# Patient Record
Sex: MALE | Race: WHITE | NOT HISPANIC OR LATINO | Employment: FULL TIME | URBAN - METROPOLITAN AREA
[De-identification: names, ages, dates, MRNs, and addresses within clinical notes are randomized per-mention and may not be internally consistent; named-entity substitution may affect disease eponyms.]

---

## 2017-10-03 ENCOUNTER — TRANSFERRED RECORDS (OUTPATIENT)
Dept: HEALTH INFORMATION MANAGEMENT | Facility: CLINIC | Age: 57
End: 2017-10-03

## 2017-10-24 ENCOUNTER — TRANSFERRED RECORDS (OUTPATIENT)
Dept: HEALTH INFORMATION MANAGEMENT | Facility: CLINIC | Age: 57
End: 2017-10-24

## 2017-10-24 LAB — EJECTION FRACTION: 60 %

## 2017-11-14 ENCOUNTER — TRANSFERRED RECORDS (OUTPATIENT)
Dept: HEALTH INFORMATION MANAGEMENT | Facility: CLINIC | Age: 57
End: 2017-11-14

## 2017-12-01 LAB
CREATININE (EXTERNAL): 1.09 MG/DL (ref 0.6–1.5)
GFR ESTIMATED (EXTERNAL): 75 ML/MIN/1.73M2
GFR ESTIMATED (IF AFRICAN AMERICAN) (EXTERNAL): 87 ML/MIN/1.73M2
GLUCOSE (EXTERNAL): 92 MG/DL (ref 65–99)
POTASSIUM (EXTERNAL): 4.1 MMOL/L (ref 3.5–5.2)

## 2017-12-21 ENCOUNTER — TRANSFERRED RECORDS (OUTPATIENT)
Dept: HEALTH INFORMATION MANAGEMENT | Facility: CLINIC | Age: 57
End: 2017-12-21

## 2018-12-19 ENCOUNTER — TRANSFERRED RECORDS (OUTPATIENT)
Dept: HEALTH INFORMATION MANAGEMENT | Facility: CLINIC | Age: 58
End: 2018-12-19

## 2019-12-10 ENCOUNTER — TRANSFERRED RECORDS (OUTPATIENT)
Dept: HEALTH INFORMATION MANAGEMENT | Facility: CLINIC | Age: 59
End: 2019-12-10

## 2019-12-18 ENCOUNTER — TRANSFERRED RECORDS (OUTPATIENT)
Dept: HEALTH INFORMATION MANAGEMENT | Facility: CLINIC | Age: 59
End: 2019-12-18

## 2020-01-19 ENCOUNTER — TRANSFERRED RECORDS (OUTPATIENT)
Dept: HEALTH INFORMATION MANAGEMENT | Facility: CLINIC | Age: 60
End: 2020-01-19

## 2021-01-20 ENCOUNTER — TRANSFERRED RECORDS (OUTPATIENT)
Dept: HEALTH INFORMATION MANAGEMENT | Facility: CLINIC | Age: 61
End: 2021-01-20

## 2021-07-21 ENCOUNTER — TRANSFERRED RECORDS (OUTPATIENT)
Dept: HEALTH INFORMATION MANAGEMENT | Facility: CLINIC | Age: 61
End: 2021-07-21

## 2021-11-18 ENCOUNTER — TRANSFERRED RECORDS (OUTPATIENT)
Dept: HEALTH INFORMATION MANAGEMENT | Facility: CLINIC | Age: 61
End: 2021-11-18

## 2021-12-08 ENCOUNTER — TRANSFERRED RECORDS (OUTPATIENT)
Dept: HEALTH INFORMATION MANAGEMENT | Facility: CLINIC | Age: 61
End: 2021-12-08

## 2022-01-19 ENCOUNTER — TRANSFERRED RECORDS (OUTPATIENT)
Dept: HEALTH INFORMATION MANAGEMENT | Facility: CLINIC | Age: 62
End: 2022-01-19

## 2022-03-03 ENCOUNTER — REFERRAL (OUTPATIENT)
Dept: TRANSPLANT | Facility: CLINIC | Age: 62
End: 2022-03-03

## 2022-03-03 NOTE — TELEPHONE ENCOUNTER
"Donor Intake Start:22Donor Intake Complete:22  Expiration Date:22  Gender:MalePreferred Language:English  Full Name:Jose Eden  Needed:[not answered]  Phone Number:[phone] Phone:  Contact Preference:[not answered]Best Contact Time:9am - 5pm  Emergency Contact:Sharda Quevedo Contact #:[phone]  Relationship to Contact:Contact is my spouse  :[birth year]Age:61  Country:[place]  Address:[address] [place] [address] RdCity:[place]  State:[id number]  Height:6'3\"Weight:242lbs  BMI:30.2  Employment Status:EmployedHas PTO for donation?Yes, using vacation  Occupation:PastorRequires Heavy Lifting?  No  Education Level:Advanced DegreeMarital Status:  Exercise Routine:4-6/WeekHealth Insurance:  No  Blood Type:UnknownEthnicity/Race:White  Donor Type:Standard Voucher Donor  Prefer Remote Donation:[not answered]  Physician:NAGI Flores  Donating for Recipient Transfer  Recipient's Center:[unknown]   Recipient's Name:Alexandro Pat :63  Recipient's Status:Patient on dialysis.How DD knows Recip:Friend  DD communicates w/ Recip:Less Than Once A Month  Indicated possible interest in:  Motivation to donate:  I love and want to help my friend!  Living Donor Pre-Screening  Is In [place]  Yes  Will Accept Blood Transfusions?  Yes  Has been Diagnosed with Kidney Disease?  No  Has had a Heart Attack?  No  Has Diabetes?  No  Has had Cancer?  No  Has had Kidney Stones?  No  Has Used Tobacco  Yes    - Currently uses Tobacco?  No    - Will Stop for Surgery?N/A    - How Many Years:1    - Tobacco use (packs/cans) / Frequency:1 / Monthly  Has HIV?  No  Is Currently Incarcerated?  No  Is Currently Residing in [place]  Yes  History Misc  Has Allergies?  Yes  Allergy  Sulfa medication  Has had Surgeries?  Yes  Surgery When  Rib fixation On 6 Ribs, bicep reattachment, cartilage knee survery 2021, 6 years ago, 39 years ago  Takes " Medication?  Yes  Medication Dose Frequency  Lialda Water pill 250 mg. 25 mg One a day. One a day  Medical History  History of High BP?  Never  Has History Of CABG (bypass surgery)?  No  History of Blood Clots?  Never  History of Coronary Disease?  Never  Has Stents Implanted?  No  Has History of Chest Pain with Exercise?  No  Has History of Chest Pain at Other Times?  No  Results of Climbing 2 Flights of Stairs?No Problem  Has had Stress Test within Last Year?  No  Has had Stroke?  No  Has had Leg Bypass?  No  History of Lung Disease?  Never  History of COPD?  Never  History of TB?  Never    - Is TB Active?[not answered]  History of Pneumonia?  Never  Has Respiratory Issues?  No  Has Gastro Issues?  Yes    - Gastro Issues:Colitis but it has been inactive for 10 years and my last colonoscopy showed no colitis  History of Gallstones?  Never  History of Pancreatitis?  Never  History of Liver Disease?  Never  History of Hepatitis B?  Never    - Is Hep B Active?[not answered]  History of Hepatitis C?  Never  History of Bleeding Problem?  Never  History of UTIs?  No  History of Kidney Damage?  Never  History of Proteinuria?  Never  History of Hematuria?  Never  History of Neuro Disease?  Never  History of Seizure?  Never  History of Lupus?  Never  History of Paralysis?  Never  History of Arthritis?  Never  History of Neuropathy?  Never  History of Depression?  Never  History of Anxiety?  Never  History of Documented Psychiatric Illness?  Never  Has had Transfusions?  No  History of Obesity?  No  History of Fabry's Disease?  No  History of Sickle Cell Disease?  No  History of Sickle Cell Trait?  No  History of Sarcoidosis?  No  History of Auto-Immune Disease  No  Has had Physical Exam?  Yes    - how many years ago:4  Has had PSA Test?  No  Has had Colonoscopy?  Yes    - How Many Years Ago:1  Medical History Comments?[no comments]  Living Donor Family Medical History  Anyone with kidney disease?  No  Anyone with liver  disease?  No  Anyone with heart disease?  No  Anyone with coronary artery disease?  No  Anyone with high blood pressure?  Yes    - which family members:Father, sister  Anyone with blood disorder?  No  Anyone with cancer?  Yes    - which family members:Mother  Anyone with kidney cancer?  No  Anyone with diabetes?  Yes    - which family members:Sister  Is mother alive?  No  Mother's age?87  Mother's cause of death?Pancreatic cancer  Is father alive?  No  Father's age?79  Father's cause of death?Complications of Primary Proggressive Aphasia  How many siblings?5  How many adult children?5  How many children under 18?0  Social History  Has Used Alcohol?  Yes    - currently uses alcohol:  Yes    - how much:2/Weekly  Has Abused Alcohol?  No  Has Used Drugs?  No  Has had legal issues w/ law enforcement?  No  Traveled over 100 miles from home in last year?  Yes    - Traveled Where?In state and out of state  Has had suicidal thoughts or attempts in the last five years?  No

## 2022-03-10 ENCOUNTER — TELEPHONE (OUTPATIENT)
Dept: TRANSPLANT | Facility: CLINIC | Age: 62
End: 2022-03-10

## 2022-03-10 NOTE — TELEPHONE ENCOUNTER
I left a  for Jose.  He was scheduled to complete his initial MARILYN call at 1:30 pm today.    RAMYA Senior, [facility]  Independent Living Donor Advocate  [facility], [facility], [facility]  Pager:  [phone]  Direct:  [phone] Cell Phone  E-Mail:  [email or web address]

## 2022-03-10 NOTE — TELEPHONE ENCOUNTER
Initial Independent Living Donor Advocate contact made with potential donor today.  I introduced myself and my role during the donation process, includin.  MARILYN ROLE   The federal government requires that all licensed transplant centers provide the living donor with an Independent Living Donor Advocate (MARILYN).  I do not meet recipients or attend meetings that discuss their care or decision to transplant them. My role is separate to avoid any conflict of interest.  My role is to ensure:  1) your rights are protected;  2) you get all the information you need from the transplant team to make a fully informed decision whether to donate;   3) that living donation is in your best interest.   4) that you have the right to decide NOT to go forward with living donation at any time during this process.  I am available to you throughout the workup, during surgery phase and follow-up at home.   2. WORKUP & PRIVACY     Your identity and workup are not shared with the recipient at any time.     There is a medical donor workup that consists of testing to determine if you are healthy enough to donate.  Workup tests include many blood draws, urine collection/ (kidney function testing), chest x-ray, EKG, CT scan. As you complete each step then you may move on to the next.  Workup can take as little or as long as you need and you can stop the process at any time.     Transplant is a treatment option, not a cure. A kidney from a living kidney donor can last 12-14 years.  Other treatment options are  donation and two types of dialysis.     This is major surgery and your estimated hospital stay is approximately 1-2 nights.  After surgery, there are driving and lifting restrictions - no driving for two weeks and no lifting over ten pounds for 6 - 8 weeks.  Donors are routinely off from work for 4 - 6 weeks after surgery, and potentially longer if they have a physical job.       If you anticipate lost wages due to donation,  donor wage reimbursement options may be available to you and will be reviewed with you during the evaluation process.      The recipient's insurance covers the medical expenses related to the donor evaluation and surgery.  However, it is important for you to carry your own health insurance to address any medical issues that are found and are NOT related to living donation.  3.  QUESTIONS  Have you received a packet from the transplant department?     Questions?    Have you discussed with anyone your potential decision to donate?   Yes.  Is anyone pressuring or coercing you to donate? No.  Have you discussed any financial arrangements with recipient around donating a kidney? No.  Are you aware that you can confidentially opt out at any time, up to and including day of donation? Yes.  At this time, would you like to proceed with the medical evaluation to see if you can be a kidney donor?  Yes.    If yes, the donor coordinator will be reaching out to you with next steps.     You can reach me or someone else on the MARILYN team by calling [phone] Option 3.    MARILYN NOTES: Jose wants to donate to a friend.  He is scheduled to talk to Dayana on 3/22 at 12:15 pm    Duration of call 20 minutes

## 2022-03-22 ENCOUNTER — TELEPHONE (OUTPATIENT)
Dept: TRANSPLANT | Facility: CLINIC | Age: 62
End: 2022-03-22

## 2022-03-22 NOTE — TELEPHONE ENCOUNTER
Contacted Jose Pimentel to introduce myself and my role, review of medical/surgical/family history and next steps.     Jose Pimentel  is aware He can stop donor evaluation at any time.    Have you ever been positive for COVID 19? Not discussed     Have you received the COVID vaccination? Not discussed If yes, when and what brand? Not discussed     Review risk of COVID-19 to living donors.     Jose Pimentel is a 61 year old male that would like to learn more about being a donor to his friend Alexandro Simpson. Jose is open to paired exchange     Concerns from medical/surgical/family history: yes, hx colitis. Reports colitis has been stable, no flairs, x10 years and last colonoscopy did not show any areas of inflammation. He does take a low dose of Lialda daily. Reports occasional diarrhea that he is able to control with dietary modifications. No hx kidney stones.     Plan established to review about with donor team prior to moving forward. Jose verbalized understanding and comfort with plan.

## 2022-03-23 ENCOUNTER — COMMITTEE REVIEW (OUTPATIENT)
Dept: TRANSPLANT | Facility: CLINIC | Age: 62
End: 2022-03-23

## 2022-03-23 NOTE — COMMITTEE REVIEW
Living Donor Patient Discussion Note Transplant Coordinator: Dayana Ga  Transplant Surgeon:       Committee Review Members:  Immunology Rhys Valencia, PhD   Nephrology Baltazar Mercedes MD, Julio Nolan MD, Ferny Barton MD   Nutrition Jolie Harris, ADRIA   Pharmacist Jeffery Pereyra, [facility]    - Clinical Rose Swain, [facility], Denise Lomeli, [facility], Jessica Dominguez   Transplant Nissa Mary Thompson, RN, Valentin Cardenas, RACIEL, Trinidad Amezquita, N, Vanessa Mahoney, RACIEL, Dayana Ga, RN, Candice Neal, RN   Transplant Surgery Daryl Denton MD       Additional Discussion Notes and Findings:     Medical hx colitis reviewed with team. Team recommended obtaining recent GI notes, colonscopy and lab work to review. Typically colitis would exclude a person from moving forward as a donor but since it has been stable for 10 years, could review records for possible consideration.     Jose updated of above. He will have records faxed for review, fax number provided.

## 2022-03-29 ENCOUNTER — TRANSFERRED RECORDS (OUTPATIENT)
Dept: HEALTH INFORMATION MANAGEMENT | Facility: CLINIC | Age: 62
End: 2022-03-29

## 2022-03-30 ENCOUNTER — TELEPHONE (OUTPATIENT)
Dept: TRANSPLANT | Facility: CLINIC | Age: 62
End: 2022-03-30

## 2022-03-30 ENCOUNTER — COMMITTEE REVIEW (OUTPATIENT)
Dept: TRANSPLANT | Facility: CLINIC | Age: 62
End: 2022-03-30
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
## 2022-03-30 NOTE — COMMITTEE REVIEW
Living Donor Patient Discussion Note Transplant Coordinator: Dayana Ga  Transplant Surgeon:       Committee Review Members:  Immunology Rhys Valencia, PhD, Meenakshi Ricci   Nephrology Baltazar Mercedes MD, Julio Nolan MD, Regis Ramirez MD, Ferny Barton MD   Nutrition Jolie Harris, RD   Pharmacist Jeffery Pereyra, [facility], Suzette Villa, [facility]    - Clinical Rose Swain, [facility]   Transplant Nissa Mary Thompson, RACIEL, Olga Ferris, NP, Valentin Cardenas, RACIEL, Trinidad Amezquita, LPN, Dayana Ga, RN, Candice Neal, RN   Transplant Surgery Tracee Gavin MD, MD       Additional Discussion Notes and Findings:     Records from last GI visit 11/18/21 at  [facility] and last coloscopy from 12/8/2021 reviewed. History of Crohns noted.     Team recommended litholink with phase 1 testing, otherwise okay to bring for evaluation.

## 2022-03-31 ENCOUNTER — DOCUMENTATION ONLY (OUTPATIENT)
Dept: TRANSPLANT | Facility: CLINIC | Age: 62
End: 2022-03-31

## 2022-03-31 DIAGNOSIS — Z00.5 TRANSPLANT DONOR EVALUATION: Primary | ICD-10-CM

## 2022-03-31 NOTE — PROGRESS NOTES
Sent Jose Phase 1 orders as well as [employer] info/instructions and billing letters.Prep orders given for both the Phase 1's and Litholinks. Is to contact us when he has turned in [employer] instructions.

## 2022-04-12 ENCOUNTER — DOCUMENTATION ONLY (OUTPATIENT)
Dept: TRANSPLANT | Facility: CLINIC | Age: 62
End: 2022-04-12

## 2022-04-12 ENCOUNTER — TELEPHONE (OUTPATIENT)
Dept: TRANSPLANT | Facility: CLINIC | Age: 62
End: 2022-04-12

## 2022-04-12 NOTE — PROGRESS NOTES
"Received vitals from [facility] in [place], [place] phone # [phone]:BP's 146/82,124/70,126/70,122/70 Ht=6' 3\"-238#'s BMI=29.8.  "

## 2022-04-12 NOTE — TELEPHONE ENCOUNTER
LM asking Jose if he's done the Phase 1's yet and if he has an idea when he's going to do the Litholinks test.To call me back and let me know.

## 2022-04-13 ENCOUNTER — TELEPHONE (OUTPATIENT)
Dept: TRANSPLANT | Facility: CLINIC | Age: 62
End: 2022-04-13

## 2022-04-13 NOTE — TELEPHONE ENCOUNTER
"Received this email from Jose re:Phase 1 labs:  \"I am getting my lab work done today.  I plan on doing my urine test on Friday.\"     "

## 2022-04-16 ENCOUNTER — TRANSFERRED RECORDS (OUTPATIENT)
Dept: HEALTH INFORMATION MANAGEMENT | Facility: CLINIC | Age: 62
End: 2022-04-16

## 2022-04-27 ENCOUNTER — COMMITTEE REVIEW (OUTPATIENT)
Dept: TRANSPLANT | Facility: CLINIC | Age: 62
End: 2022-04-27

## 2022-04-27 NOTE — COMMITTEE REVIEW
Living Donor Patient Discussion Note Transplant Coordinator: Dayana Ga  Transplant Surgeon:       Committee Review Members:  Immunology Rhys Valencia, PhD   Nephrology Baltazar Mercedes MD, Titus Chowdary MD, Regis Ramirez MD, Ferny Barton MD   Nutrition Jolie Harris, ADRIA   Pharmacist Jeffery Pereyra, [facility]    - Clinical Rose Swain, [facility], Jessica Dominguez   Transplant Nissa Mary Thompson, RN, Britta Mack, RN, Valentin Cardenas, RACIEL, Trinidad Amezquita, GURPREET, Vanessa Mahoney, RACIEL, Dayana Ga, RN, Candice Neal, RN   Transplant Surgery Lei Hanson MD       Additional Discussion Notes and Findings:     Litholink and phase 1 testing reviewed.     Plan for nephrology to review further and advise if evaluation appropriate.     Records sent to Dr. Barton for review.     Jose updated of above.

## 2022-05-04 ENCOUNTER — COMMITTEE REVIEW (OUTPATIENT)
Dept: TRANSPLANT | Facility: CLINIC | Age: 62
End: 2022-05-04

## 2022-05-04 ENCOUNTER — TELEPHONE (OUTPATIENT)
Dept: TRANSPLANT | Facility: CLINIC | Age: 62
End: 2022-05-04

## 2022-05-04 NOTE — COMMITTEE REVIEW
Living Donor Patient Discussion Note Transplant Coordinator: Dayana Ga  Transplant Surgeon:       Committee Review Members:  Immunology Rhys Valencia, PhD, Meenakshi Ricci   Nephrology Baltazar Mercedes MD, Regis Ramirez MD, Ferny Barton MD   Nutrition Jolie Harris, ADRIA   Pharmacist Jeffery Pereyra, [facility]    - Clinical Rose Swain, [facility]   Transplant Nissa Mary Thompson, RN, Valentin Cardenas, RN, Dayana Ga, RACIEL, Candice Neal RN   Transplant Surgery Omid Funk MD       Additional Discussion Notes and Findings:     Case reviewed by Dr. Barton. Team supported Jose moving forward with evaluation. Could do creatinine clearence prior to coming if he wants.     Above reviewed with Jose who confirmed his desire to move forward with scheduling evaluation. He would like to come on June 2nd. He will review consent and [facility] prior to evaluation. He knows he will need a COVID PCR and to arrive fasting and hydrated. Message sent for scheduling and also to JHON.

## 2022-05-04 NOTE — TELEPHONE ENCOUNTER
SW called patient and left message for potential living donor to discuss financial assistance options for upcoming travel to [facility].     SW received call back and reviewed NKR, NLDAC, and other financial resources available. Jose would like to inquire if he is eligible for NLDAC assistance. SW to send paperwork via email if eligible.     Jessica Dominguez [facility] Center[facility], CCTSW   Living Donor   [facility], [facility], [facility]  Direct: [phone]  E-Mail: [email or web address]

## 2022-05-06 DIAGNOSIS — Z00.5 TRANSPLANT DONOR EVALUATION: Primary | ICD-10-CM

## 2022-05-06 RX ORDER — METHYLPREDNISOLONE SODIUM SUCCINATE 125 MG/2ML
125 INJECTION, POWDER, LYOPHILIZED, FOR SOLUTION INTRAMUSCULAR; INTRAVENOUS
Status: CANCELLED
Start: 2022-06-02

## 2022-05-06 RX ORDER — ALBUTEROL SULFATE 90 UG/1
1-2 AEROSOL, METERED RESPIRATORY (INHALATION)
Status: CANCELLED
Start: 2022-06-02

## 2022-05-06 RX ORDER — DIPHENHYDRAMINE HYDROCHLORIDE 50 MG/ML
50 INJECTION INTRAMUSCULAR; INTRAVENOUS
Status: CANCELLED
Start: 2022-06-02

## 2022-05-06 RX ORDER — EPINEPHRINE 1 MG/ML
0.3 INJECTION, SOLUTION, CONCENTRATE INTRAVENOUS EVERY 5 MIN PRN
Status: CANCELLED | OUTPATIENT
Start: 2022-06-02

## 2022-05-06 RX ORDER — ALBUTEROL SULFATE 0.83 MG/ML
2.5 SOLUTION RESPIRATORY (INHALATION)
Status: CANCELLED | OUTPATIENT
Start: 2022-06-02

## 2022-05-06 RX ORDER — MEPERIDINE HYDROCHLORIDE 25 MG/ML
25 INJECTION INTRAMUSCULAR; INTRAVENOUS; SUBCUTANEOUS EVERY 30 MIN PRN
Status: CANCELLED | OUTPATIENT
Start: 2022-06-02

## 2022-05-06 RX ORDER — NALOXONE HYDROCHLORIDE 0.4 MG/ML
0.2 INJECTION, SOLUTION INTRAMUSCULAR; INTRAVENOUS; SUBCUTANEOUS
Status: CANCELLED | OUTPATIENT
Start: 2022-06-02

## 2022-05-19 ENCOUNTER — DOCUMENTATION ONLY (OUTPATIENT)
Dept: TRANSPLANT | Facility: CLINIC | Age: 62
End: 2022-05-19

## 2022-05-19 NOTE — PROGRESS NOTES
[facility] informed Jose and JHON that he was approved for assistance with travel expenses through NLDAC program.     Jessica Dominguez, [facility]ON, CCTSW   Living Donor   [facility] [place], [facility], [facility]  Direct: [phone]  E-Mail: [email or web address]

## 2022-05-29 ENCOUNTER — HEALTH MAINTENANCE LETTER (OUTPATIENT)
Age: 62
End: 2022-05-29

## 2022-06-01 NOTE — PROGRESS NOTES
[facility] Solid Organ Transplant  Outpatient MNT: Kidney Donor Evaluation    Current BMI: 29.79 (HT 75 in, .4 lbs/108.1 kg)  BMI is within recommendation of <30 for kidney donation  -Discussed that pt should not gain additional weight d/t being borderline BMI > 30. Also should be noted pt with muscular build and history of weight lifting.     8 Year Estimated Risk of T2DM  4%     Time Spent: 15 minutes  Visit Type: Initial  Referring Physician: Titus Chowdary MD  Pt accompanied by: Self    Nutrition Assessment     Vitamins, Supplements, Pertinent Meds: None.   Herbal Medicines/Supplements: Protein supplements (discussed pt checking this for herbals / extracts when able).     Weight hx: Pt states historically steady weight.     Food Security: any concerns about having enough money to buy food or access to grocery stores? None.     Diet Recall  Breakfast Often skips breakfast, if he does have breakfast he will have Cheerios and raisins.    Lunch PBJ sandwiches, soup.    Dinner Steak, perogis, onion rings    Snacks Granola bar, pretzels,    Beverages Water, occasional diet Coke, some sparkling polanco. Some coffee too, no sugar / cream.    Alcohol 1-2 times a week, glass or two of wine.    Dining out ~2 times a month, breakfast places, fish / mexican restaurant.      Physical Activity  Home workout 6 days a week (bike or rowing), calisthenics, and has some free weights.     Labs  No results for input(s): CHOL, HDL, LDL, TRIG, CHOLHDLRATIO in the last 78777 hours.    FBG = 114  A1c = 5.6%   BP = 126/77  Sister has T2DM.   Prediction of Incident Diabetes Mellitus in Middle-aged Adults: The Erwin Offspring Study  Wisam Nino MD; James B. Meigs, MD, MPH; Delmis Montenegro, PhD; Jessica Mack MD, MPH; Rhys Yepez MD; Bill Guo Sr,   PhD  Pt's estimated risk for T2DM (per Table 6 above)  Pt received points for the following criteria: FBG of 114 mg/dL, and BMI 25.0-29.9.  Total  points: 12  8-Year estimated risk of T2DM: 4%    Nutrition Diagnosis  No nutrition diagnosis identified at this time.    Nutrition Intervention  Nutrition education provided:  Reviewed overall healthy diet guidelines for pre and post kidney donation. Discussed maintenance of a healthy weight and Na+ intake <3000 mg/day (<2000 mg/day if HTN).    Avoid the following post op d/t unknown effects on the organs:  - Herbal, Chinese, holistic, chiropractic, natural, alternative medicines and supplements  - Detoxes and cleanses  - Weight loss pills  - Protein powders or other products with extracts or herbs (ie green tea extract)    Patient Understanding: Pt verbalized understanding of education provided.  Expected Engagement: Good  Follow-Up Plans: PRN     Nutrition Goals  No nutrition goals identified at this time     Uday Matta RD, LD

## 2022-06-02 ENCOUNTER — OFFICE VISIT (OUTPATIENT)
Dept: INFUSION THERAPY | Facility: CLINIC | Age: 62
End: 2022-06-02
Attending: INTERNAL MEDICINE

## 2022-06-02 ENCOUNTER — OFFICE VISIT (OUTPATIENT)
Dept: NEPHROLOGY | Facility: CLINIC | Age: 62
End: 2022-06-02
Attending: TRANSPLANT SURGERY

## 2022-06-02 ENCOUNTER — LAB (OUTPATIENT)
Dept: LAB | Facility: CLINIC | Age: 62
End: 2022-06-02
Attending: INTERNAL MEDICINE

## 2022-06-02 ENCOUNTER — OFFICE VISIT (OUTPATIENT)
Dept: TRANSPLANT | Facility: CLINIC | Age: 62
End: 2022-06-02

## 2022-06-02 ENCOUNTER — APPOINTMENT (OUTPATIENT)
Dept: TRANSPLANT | Facility: CLINIC | Age: 62
End: 2022-06-02
Attending: TRANSPLANT SURGERY

## 2022-06-02 ENCOUNTER — ALLIED HEALTH/NURSE VISIT (OUTPATIENT)
Dept: TRANSPLANT | Facility: CLINIC | Age: 62
End: 2022-06-02
Attending: TRANSPLANT SURGERY

## 2022-06-02 ENCOUNTER — OFFICE VISIT (OUTPATIENT)
Dept: TRANSPLANT | Facility: CLINIC | Age: 62
End: 2022-06-02
Attending: TRANSPLANT SURGERY

## 2022-06-02 ENCOUNTER — ANCILLARY PROCEDURE (OUTPATIENT)
Dept: GENERAL RADIOLOGY | Facility: CLINIC | Age: 62
End: 2022-06-02
Attending: INTERNAL MEDICINE

## 2022-06-02 ENCOUNTER — ANCILLARY PROCEDURE (OUTPATIENT)
Dept: CT IMAGING | Facility: CLINIC | Age: 62
End: 2022-06-02
Attending: INTERNAL MEDICINE

## 2022-06-02 VITALS
WEIGHT: 238.32 LBS | BODY MASS INDEX: 29.63 KG/M2 | OXYGEN SATURATION: 99 % | DIASTOLIC BLOOD PRESSURE: 77 MMHG | HEART RATE: 82 BPM | SYSTOLIC BLOOD PRESSURE: 126 MMHG | HEIGHT: 75 IN

## 2022-06-02 VITALS
WEIGHT: 238.3 LBS | HEART RATE: 82 BPM | DIASTOLIC BLOOD PRESSURE: 77 MMHG | SYSTOLIC BLOOD PRESSURE: 126 MMHG | OXYGEN SATURATION: 99 % | HEIGHT: 75 IN | BODY MASS INDEX: 29.63 KG/M2

## 2022-06-02 VITALS
DIASTOLIC BLOOD PRESSURE: 83 MMHG | SYSTOLIC BLOOD PRESSURE: 131 MMHG | HEART RATE: 54 BPM | WEIGHT: 238.7 LBS | BODY MASS INDEX: 29.68 KG/M2 | RESPIRATION RATE: 16 BRPM | OXYGEN SATURATION: 100 % | HEIGHT: 75 IN | TEMPERATURE: 97.4 F

## 2022-06-02 DIAGNOSIS — E78.2 MIXED HYPERLIPIDEMIA: ICD-10-CM

## 2022-06-02 DIAGNOSIS — N28.1 RENAL CYST, RIGHT: ICD-10-CM

## 2022-06-02 DIAGNOSIS — Z00.5 TRANSPLANT DONOR EVALUATION: ICD-10-CM

## 2022-06-02 DIAGNOSIS — R73.9 HYPERGLYCEMIA: ICD-10-CM

## 2022-06-02 DIAGNOSIS — K50.918 CROHN'S DISEASE WITH OTHER COMPLICATION, UNSPECIFIED GASTROINTESTINAL TRACT LOCATION (H): ICD-10-CM

## 2022-06-02 DIAGNOSIS — R82.994 HYPERCALCIURIA: ICD-10-CM

## 2022-06-02 DIAGNOSIS — Z00.5 TRANSPLANT DONOR EVALUATION: Primary | ICD-10-CM

## 2022-06-02 DIAGNOSIS — R94.31 T WAVE INVERSION IN EKG: ICD-10-CM

## 2022-06-02 DIAGNOSIS — R76.8 POSITIVE HEPATITIS C ANTIBODY TEST: ICD-10-CM

## 2022-06-02 DIAGNOSIS — B18.2 CHRONIC HEPATITIS C WITHOUT HEPATIC COMA (H): ICD-10-CM

## 2022-06-02 LAB
ABO/RH(D): NORMAL
ABO/RH(D): NORMAL
ALBUMIN SERPL-MCNC: 3.9 G/DL (ref 3.4–5)
ALBUMIN UR-MCNC: NEGATIVE MG/DL
ALP SERPL-CCNC: 67 U/L (ref 40–150)
ALT SERPL W P-5'-P-CCNC: 43 U/L (ref 0–70)
ANION GAP SERPL CALCULATED.3IONS-SCNC: 8 MMOL/L (ref 3–14)
ANTIBODY SCREEN: NORMAL
APPEARANCE UR: CLEAR
AST SERPL W P-5'-P-CCNC: 29 U/L (ref 0–45)
BILIRUB SERPL-MCNC: 0.8 MG/DL (ref 0.2–1.3)
BILIRUB UR QL STRIP: NEGATIVE
BUN SERPL-MCNC: 15 MG/DL (ref 7–30)
CALCIUM SERPL-MCNC: 9 MG/DL (ref 8.5–10.1)
CHLORIDE BLD-SCNC: 104 MMOL/L (ref 94–109)
CHOLEST SERPL-MCNC: 202 MG/DL
CO2 SERPL-SCNC: 27 MMOL/L (ref 20–32)
COLOR UR AUTO: YELLOW
CREAT SERPL-MCNC: 0.94 MG/DL (ref 0.66–1.25)
CREAT UR-MCNC: 85 MG/DL
CREAT UR-MCNC: 85 MG/DL
ERYTHROCYTE [DISTWIDTH] IN BLOOD BY AUTOMATED COUNT: 12.5 % (ref 10–15)
FASTING STATUS PATIENT QL REPORTED: YES
GFR SERPL CREATININE-BSD FRML MDRD: >90 ML/MIN/1.73M2
GLUCOSE BLD-MCNC: 114 MG/DL (ref 70–99)
GLUCOSE UR STRIP-MCNC: NEGATIVE MG/DL
HBA1C MFR BLD: 5.6 % (ref 0–5.6)
HBV CORE AB SERPL QL IA: NONREACTIVE
HBV SURFACE AB SERPL IA-ACNC: 0 M[IU]/ML
HBV SURFACE AG SERPL QL IA: NONREACTIVE
HCT VFR BLD AUTO: 47.1 % (ref 40–53)
HCV AB SERPL QL IA: REACTIVE
HDLC SERPL-MCNC: 68 MG/DL
HGB BLD-MCNC: 16.3 G/DL (ref 13.3–17.7)
HGB UR QL STRIP: NEGATIVE
HIV 1+2 AB+HIV1 P24 AG SERPL QL IA: NONREACTIVE
INR PPP: 0.9 (ref 0.85–1.15)
KETONES UR STRIP-MCNC: NEGATIVE MG/DL
LDLC SERPL CALC-MCNC: 117 MG/DL
LEUKOCYTE ESTERASE UR QL STRIP: NEGATIVE
MCH RBC QN AUTO: 30.8 PG (ref 26.5–33)
MCHC RBC AUTO-ENTMCNC: 34.6 G/DL (ref 31.5–36.5)
MCV RBC AUTO: 89 FL (ref 78–100)
MICROALBUMIN UR-MCNC: 5 MG/L
MICROALBUMIN/CREAT UR: 5.88 MG/G CR (ref 0–17)
NITRATE UR QL: NEGATIVE
NONHDLC SERPL-MCNC: 134 MG/DL
PH UR STRIP: 6 [PH] (ref 5–7)
PHOSPHATE SERPL-MCNC: 2.7 MG/DL (ref 2.5–4.5)
PLATELET # BLD AUTO: 186 10E3/UL (ref 150–450)
POTASSIUM BLD-SCNC: 3.5 MMOL/L (ref 3.4–5.3)
PROT SERPL-MCNC: 7.2 G/DL (ref 6.8–8.8)
PROT UR-MCNC: 0.07 G/L
PROT/CREAT 24H UR: 0.08 G/G CR (ref 0–0.2)
PSA SERPL-MCNC: 0.35 UG/L (ref 0–4)
RBC # BLD AUTO: 5.3 10E6/UL (ref 4.4–5.9)
RBC URINE: 1 /HPF
SODIUM SERPL-SCNC: 139 MMOL/L (ref 133–144)
SP GR UR STRIP: 1.01 (ref 1–1.03)
SPECIMEN EXPIRATION DATE: NORMAL
SPECIMEN EXPIRATION DATE: NORMAL
T PALLIDUM AB SER QL: NONREACTIVE
TRIGL SERPL-MCNC: 85 MG/DL
URATE SERPL-MCNC: 4.7 MG/DL (ref 3.5–7.2)
UROBILINOGEN UR STRIP-MCNC: NORMAL MG/DL
WBC # BLD AUTO: 5.8 10E3/UL (ref 4–11)
WBC URINE: <1 /HPF

## 2022-06-02 PROCEDURE — 84550 ASSAY OF BLOOD/URIC ACID: CPT

## 2022-06-02 PROCEDURE — 84100 ASSAY OF PHOSPHORUS: CPT

## 2022-06-02 PROCEDURE — 86789 WEST NILE VIRUS ANTIBODY: CPT

## 2022-06-02 PROCEDURE — 85027 COMPLETE CBC AUTOMATED: CPT

## 2022-06-02 PROCEDURE — 99215 OFFICE O/P EST HI 40 MIN: CPT | Performed by: TRANSPLANT SURGERY

## 2022-06-02 PROCEDURE — 71046 X-RAY EXAM CHEST 2 VIEWS: CPT | Mod: GC | Performed by: RADIOLOGY

## 2022-06-02 PROCEDURE — 74175 CTA ABDOMEN W/CONTRAST: CPT | Performed by: RADIOLOGY

## 2022-06-02 PROCEDURE — 36415 COLL VENOUS BLD VENIPUNCTURE: CPT

## 2022-06-02 PROCEDURE — 81378 HLA I & II TYPING HR: CPT

## 2022-06-02 PROCEDURE — 86803 HEPATITIS C AB TEST: CPT

## 2022-06-02 PROCEDURE — 36415 COLL VENOUS BLD VENIPUNCTURE: CPT | Performed by: INTERNAL MEDICINE

## 2022-06-02 PROCEDURE — 84156 ASSAY OF PROTEIN URINE: CPT

## 2022-06-02 PROCEDURE — G0103 PSA SCREENING: HCPCS

## 2022-06-02 PROCEDURE — 82043 UR ALBUMIN QUANTITATIVE: CPT

## 2022-06-02 PROCEDURE — 83036 HEMOGLOBIN GLYCOSYLATED A1C: CPT

## 2022-06-02 PROCEDURE — 80061 LIPID PANEL: CPT

## 2022-06-02 PROCEDURE — 86665 EPSTEIN-BARR CAPSID VCA: CPT

## 2022-06-02 PROCEDURE — 255N000002 HC RX 255 OP 636: Performed by: INTERNAL MEDICINE

## 2022-06-02 PROCEDURE — 99204 OFFICE O/P NEW MOD 45 MIN: CPT | Performed by: TRANSPLANT SURGERY

## 2022-06-02 PROCEDURE — 80053 COMPREHEN METABOLIC PANEL: CPT

## 2022-06-02 PROCEDURE — 86481 TB AG RESPONSE T-CELL SUSP: CPT

## 2022-06-02 PROCEDURE — 82542 COL CHROMOTOGRAPHY QUAL/QUAN: CPT | Performed by: INTERNAL MEDICINE

## 2022-06-02 PROCEDURE — 81001 URINALYSIS AUTO W/SCOPE: CPT

## 2022-06-02 PROCEDURE — 86901 BLOOD TYPING SEROLOGIC RH(D): CPT

## 2022-06-02 PROCEDURE — 86644 CMV ANTIBODY: CPT

## 2022-06-02 PROCEDURE — 86704 HEP B CORE ANTIBODY TOTAL: CPT

## 2022-06-02 PROCEDURE — 86706 HEP B SURFACE ANTIBODY: CPT

## 2022-06-02 PROCEDURE — 87340 HEPATITIS B SURFACE AG IA: CPT

## 2022-06-02 PROCEDURE — 99207 PR NO CHARGE COORDINATED CARE PS: CPT

## 2022-06-02 PROCEDURE — 85610 PROTHROMBIN TIME: CPT | Performed by: INTERNAL MEDICINE

## 2022-06-02 PROCEDURE — 86850 RBC ANTIBODY SCREEN: CPT

## 2022-06-02 PROCEDURE — 99205 OFFICE O/P NEW HI 60 MIN: CPT | Performed by: INTERNAL MEDICINE

## 2022-06-02 PROCEDURE — 86780 TREPONEMA PALLIDUM: CPT

## 2022-06-02 RX ORDER — METHYLPREDNISOLONE SODIUM SUCCINATE 125 MG/2ML
125 INJECTION, POWDER, LYOPHILIZED, FOR SOLUTION INTRAMUSCULAR; INTRAVENOUS
Status: CANCELLED
Start: 2022-06-02

## 2022-06-02 RX ORDER — HYDROCHLOROTHIAZIDE 25 MG/1
25 TABLET ORAL DAILY
Status: ON HOLD | COMMUNITY
Start: 2021-10-04 | End: 2022-10-21

## 2022-06-02 RX ORDER — ALBUTEROL SULFATE 90 UG/1
1-2 AEROSOL, METERED RESPIRATORY (INHALATION)
Status: CANCELLED
Start: 2022-06-02

## 2022-06-02 RX ORDER — DIPHENOXYLATE HYDROCHLORIDE AND ATROPINE SULFATE 2.5; .025 MG/1; MG/1
1 TABLET ORAL DAILY
COMMUNITY

## 2022-06-02 RX ORDER — IBUPROFEN 200 MG
1 CAPSULE ORAL DAILY
COMMUNITY

## 2022-06-02 RX ORDER — NALOXONE HYDROCHLORIDE 0.4 MG/ML
0.2 INJECTION, SOLUTION INTRAMUSCULAR; INTRAVENOUS; SUBCUTANEOUS
Status: CANCELLED | OUTPATIENT
Start: 2022-06-02

## 2022-06-02 RX ORDER — IOPAMIDOL 755 MG/ML
100 INJECTION, SOLUTION INTRAVASCULAR ONCE
Status: COMPLETED | OUTPATIENT
Start: 2022-06-02 | End: 2022-06-02

## 2022-06-02 RX ORDER — MESALAMINE 1.2 G/1
300-600 TABLET, DELAYED RELEASE ORAL DAILY
COMMUNITY
Start: 2012-07-20

## 2022-06-02 RX ORDER — DIPHENHYDRAMINE HYDROCHLORIDE 50 MG/ML
50 INJECTION INTRAMUSCULAR; INTRAVENOUS
Status: CANCELLED
Start: 2022-06-02

## 2022-06-02 RX ORDER — EPINEPHRINE 1 MG/ML
0.3 INJECTION, SOLUTION INTRAMUSCULAR; SUBCUTANEOUS EVERY 5 MIN PRN
Status: CANCELLED | OUTPATIENT
Start: 2022-06-02

## 2022-06-02 RX ORDER — ALBUTEROL SULFATE 0.83 MG/ML
2.5 SOLUTION RESPIRATORY (INHALATION)
Status: CANCELLED | OUTPATIENT
Start: 2022-06-02

## 2022-06-02 RX ORDER — CALCIUM CARBONATE/VITAMIN D3 600 MG-10
1 TABLET ORAL DAILY
COMMUNITY

## 2022-06-02 RX ORDER — MEPERIDINE HYDROCHLORIDE 25 MG/ML
25 INJECTION INTRAMUSCULAR; INTRAVENOUS; SUBCUTANEOUS EVERY 30 MIN PRN
Status: CANCELLED | OUTPATIENT
Start: 2022-06-02

## 2022-06-02 RX ORDER — FLAXSEED OIL
1 OIL (ML) MISCELLANEOUS DAILY
COMMUNITY

## 2022-06-02 RX ADMIN — IOHEXOL 10 ML: 140 INJECTION INTRAVENOUS at 07:58

## 2022-06-02 RX ADMIN — IOPAMIDOL 100 ML: 755 INJECTION, SOLUTION INTRAVASCULAR at 12:49

## 2022-06-02 ASSESSMENT — PAIN SCALES - GENERAL: PAINLEVEL: NO PAIN (0)

## 2022-06-02 NOTE — LETTER
Date:Nolvia 3, 2022      Provider requested that no letter be sent. Do not send.       [facility]

## 2022-06-02 NOTE — PROGRESS NOTES
TRANSPLANT NEPHROLOGY DONOR EVALUATION    Assessment and Plan:    # Prospective Kidney Transplant Donor: Patient with several issues that need to be addressed prior to donation. Patient's blood pressure is acceptable at this visit, kidney function appears to be acceptable with Iohexol performed, and urinalysis is bland.    Despite having Crohn's disease, his disease appears quiescent/well managed on mesalamine in the context of excellent kidney function, particularly when looking at raw function as he is a large man (6'3, 238 lbs with kidneys both over 220cc). He has never had kidney stones, has no stones on imaging.     While he does have mild hypercalciuria, his linkolink is otherwise favorable. This was on hydrochlorothiazide. He could benefit from reducing sodium in his diet as well both for stone and blood pressure risk. Would check PTH, vitamin D, calcitriol. May have incomplete/partial RTA, idiopathic hypercalciuria leading to this. Oxalate within normal limits.     Has EKG abnormalities. Fairly active without known ischemia, dysrhythmia. Would benefit from Cardiology opinion     He has a positive Hepatitis C antibody test. Needs HCV PCR.     Though hyperglycemic, not overt diabetes, age, relatively low risk for progression.       #Ileocolonic Crohn's Disease, Strong family history of colonic neoplasia   -Crohn's quiescent with 4 colonoscopies since 2011 most recent 12/8/2021 without signs of active colitis, or evidence of dysplasia.   -Plan to repeat colonoscopy every 3-5 years     #EKG abnormalities with hypertension on medications, abnormal rhythm.   -Stress Echocardiogram   -Obtain prior cardiology notes with reported dysrhythmia history   -If unrevealing, cardiology consult.     #Hepatitis C antibody reactivity   -HCV PCR     #Hypercalciuria   -He is normocalcemic; would check PTH, calcitriol level     -He has had hypokalemia, variable bicarbonate levels (though usually high) and urine pH >5.3. He could  have an incomplete/partial RTA, idiopathic hypercalciuria as well.     -Reducing sodium intake will help with hypercalciuria     -May benefit from reducing animal protein in diet as well as he has hyperuricosuria.    #Hyperglycemia on fasting blood glucose and hemoglobin A1c 5.6  -Not overtly diabetic   -8 Year Estimated Risk of T2DM = 4%  -Could be in part related to thiazide, though effect mitigated by potassium repletion.     Kristian T, Dilshad LJ, Jimmy ER 3rd, Td MJ, Juan J RS. Changes in serum potassium mediate thiazide-induced diabetes. Hypertension. 2008 Dec;52(6):1022-9. doi: [id number]. Epub 2008 Nov 3. Erratum in: Hypertension. 2009 Feb;53(2):e19. PMID: [id number]; PMCID: [id number].     #Mixed hyperlipidemia   -May benefit from statin, particularly if a donor candidate     #Surgical/Anatomical   -247/476 = 51.9%; renal cysts noted  -229/476 = 48.1%  Left kidney, appreciate surgery CT review   Also with left inguinal hernia    #Psychosocial   -No concerns for living donation     Discussed the risks of donating a kidney, including the surgical risk and the possible risks of living with one kidney.    Education about expected post-donation kidney function and how chronic kidney disease (CKD) and end stage kidney disease (ESKD) might potentially impact the donor in the future, include, but not limited to:       - On average, donors will have 25-35% permanent loss of kidney function at donation.       - Baseline risk of ESKD may slightly exceed that of members of the general population with the same demographic profile.       - Donor risks must be interpreted in light of known epidemiology of both CKD or ESKD, such as that CKD generally develops in midlife (40-50 years old) and ESKD generally develops after age 60.       - Medical evaluation of young potential donors cannot predict lifetime risk of CKD or ESKD.       - Donors may be at higher risk for CKD if they sustain damage to the  remaining kidney.       - Development of CKD and progression of ESKD may be more rapid with only 1 kidney.       - Some type of kidney replacement therapy, either kidney transplant or dialysis, is required when reaching ESKD.       - I described to him absolute and relative risk of ESKD based on the work of Agens et al. I noted that this is an extrapolation from 15 years to lifetime risk. I also described safety net and how his donation would lead to accrual of wait time were he to need a kidney transplant.       Potential medical or surgical risks include, but not limited to:       - Death.       - Scars, pain, fatigue, and other consequences typical of any surgical procedure.       - Decreased kidney function.       - Abdominal or bowel symptoms, such as bloating and nausea, and developing bowel obstruction.       - Kidney failure (ESKD) and the need for a kidney transplant or dialysis for the donor.       - Impact of obesity, hypertension, or other donor-specific medical conditions on morbidity and mortality of the potential donor.    Patients overall evaluation will be discussed with the transplant team and a final recommendation on the patients' suitability to be a kidney transplant donor will be made at that time.    Consult:  Jose Pimentel was seen in consultation at the request of Dr. Lei Hanson for evaluation as a potential kidney transplant donor.    Reason for Visit:  Jose Pimentel is a 61 year old male who presents for a kidney donor evaluation.  Patient would like to donate to his friend.  He would be interested in paired exchange if needed       Present Condition and Donor-Related Medical History:         Kidney Disease Hx:       h/o Kidney Problems: No  Family h/o Genetic Kidney Disease: No       h/o Hypertension: Yes; been on hydrochlorothiazide for 4 years. Only taking 12.5mg for awhile. Wasn't able to work out and put him back on 25mg daily. He has been working out. Haven't measured Bps in  "awhile.       Usual Blood Pressure: Doesn't know       h/o Protein in Urine: No  h/o Blood in Urine: No       h/o Kidney Stones: No  h/o Kidney Injury: No       h/o Recurrent UTI: No  h/o Genitourinary Problems: No       h/o Chronic NSAID Use: No         Other Medical Hx:       h/o Diabetes: No             h/o Gastrointestinal, Pancreas or Liver Problems: Yes: been on mesalamine for 14 years; Crohn's disease, last flare \"years\"/at least 12 years        h/o Lung or Heart Problems: No; he has arrhythmia. Went to a cardiologist. Not atrial fibrillation.        h/o Hematologic Problems: No  h/o Bleeding or Clotting Problems: No       h/o Cancer: No       h/o Infection Problems: No               Skin Cancer Risk:       Sees Dermatologist; has had borderline. Monitor every 6 months        h/o more than 50 moles: No       h/o extensive sun exposure: Yes        h/o melanoma: No       Family h/o melanoma: No         Mental Health Assessment:       h/o Depression: No       h/o Psychiatric Illness: No       h/o Suicidal Attempt(s): No    COVID Status:  Vaccination Up To Date: Yes  H/o COVID Infection: Yes     Review Of Systems:   A comprehensive review of systems was obtained and negative, except as noted in the HPI or PMH.    Past Medical History:   History was taken from the patient as noted below.  Past Medical History:   Diagnosis Date     Borderline blood pressure      Colitis      Crohn's disease (H)      Infection due to 2019 novel coronavirus 01/2022     Nonspecific abnormal electrocardiogram (ECG) (EKG)     per pt- irregular rate       Past Surgery History:   Past Surgical History:   Procedure Laterality Date     bicep reattachment       KNEE SURGERY       ZZC FIXATION OF ABDOMINAL RIB       Personal or family history of anesthesia problems: No    Family History:   No family history on file.       Specific Family History in First Degree Relatives:       FH of Kidney Dz: No FH of Diabetes: Yes; diabetes. Brother " hypoglycemia.  Not on insulin.        FH of Hypertension: Yes, Marcelino.   FH of CAD: No       FH of Cancer: Yes, mom  of pancreatic cancer. Uncles  of cancer. FH of Kidney Cancer: No    Personal History:    Non-smoking.   Dipped for 2 years but stopped years ago.     Social History     Socioeconomic History     Marital status:      Spouse name: Not on file     Number of children: Not on file     Years of education: Not on file     Highest education level: Not on file   Occupational History     Not on file   Tobacco Use     Smoking status: Never Smoker     Smokeless tobacco: Former User   Substance and Sexual Activity     Alcohol use: Yes     Drug use: Never     Sexual activity: Not on file   Other Topics Concern     Not on file   Social History Narrative     Not on file     Social Determinants of Health     Financial Resource Strain: Not on file   Food Insecurity: Not on file   Transportation Needs: Not on file   Physical Activity: Not on file   Stress: Not on file   Social Connections: Not on file   Intimate Partner Violence: Not on file   Housing Stability: Not on file          Specific Social History:       Health Insurance Status: Yes       Employment Status: Full time  Occupation:                         Living Arrangements: lives with their spouse       Social Support: Yes       Presence of increased risk for disease transmission behaviors as defined by PHS guidelines: No        Allergies:  Allergies   Allergen Reactions     Sulfa Drugs Hives       Medications:  Current Outpatient Medications   Medication Sig     calcium citrate (CITRACAL) 950 (200 Ca) MG tablet Take 1 tablet by mouth daily     Cholecalciferol 10 MCG (400 UNIT) CAPS      Flaxseed Oil (LINSEED OIL) OIL      hydrochlorothiazide (HYDRODIURIL) 25 MG tablet Take 25 mg by mouth     mesalamine (LIALDA) 1.2 g DR tablet Take 1,200 mg by mouth     Multiple Vitamin (MULTI-VITAMINS) TABS Take 1 tablet by mouth daily     omega 3 1200 MG  "CAPS      No current facility-administered medications for this visit.     There are no discontinued medications.    Also takes glucosamine-chondrontin    Vitals:  Vital Signs 6/2/2022 6/2/2022 6/2/2022   Systolic 124 126 126   Diastolic 79 77 77   Pulse - - 82   Temperature - - -   Respirations - - -   Weight (LB) - - 238 lb 5.1 oz   Height - - 6' 3\"   BMI (Calculated) - - 29.79   Pain Score - - -   O2 - - 99       Exam:   GENERAL APPEARANCE: alert and no distress  HENT: mouth without ulcers or lesions  LYMPHATICS: no cervical or supraclavicular nodes  RESP: lungs clear to auscultation - no rales, rhonchi or wheezes  CV: regular rhythm, normal rate, no rub, no murmur  EDEMA: no LE edema bilaterally  ABDOMEN: soft, nondistended, nontender, bowel sounds normal  MS: extremities normal - no gross deformities noted, no evidence of inflammation in joints, no muscle tenderness  SKIN: no rash    Results:   Labs and imaging were ordered for this visit and reviewed by me.  Recent Results (from the past 336 hour(s))   Prostate spec antigen screen  for men over 50    Collection Time: 06/02/22  6:46 AM   Result Value Ref Range    Prostate Specific Antigen Screen 0.35 0.00 - 4.00 ug/L   Protein  random urine    Collection Time: 06/02/22  6:46 AM   Result Value Ref Range    Total Protein Random Urine g/L 0.07 g/L    Total Protein Urine g/gr Creatinine 0.08 0.00 - 0.20 g/g Cr    Creatinine Urine mg/dL 85 mg/dL   Albumin Random Urine Quantitative with Creat Ratio    Collection Time: 06/02/22  6:46 AM   Result Value Ref Range    Creatinine Urine mg/dL 85 mg/dL    Albumin Urine mg/L 5 mg/L    Albumin Urine mg/g Cr 5.88 0.00 - 17.00 mg/g Cr   Routine UA with microscopic    Collection Time: 06/02/22  6:46 AM   Result Value Ref Range    Color Urine Yellow Colorless, Straw, Light Yellow, Yellow    Appearance Urine Clear Clear    Glucose Urine Negative Negative mg/dL    Bilirubin Urine Negative Negative    Ketones Urine Negative Negative " mg/dL    Specific Gravity Urine 1.012 1.003 - 1.035    Blood Urine Negative Negative    pH Urine 6.0 5.0 - 7.0    Protein Albumin Urine Negative Negative mg/dL    Urobilinogen Urine Normal Normal, 2.0 mg/dL    Nitrite Urine Negative Negative    Leukocyte Esterase Urine Negative Negative    RBC Urine 1 <=2 /HPF    WBC Urine <1 <=5 /HPF   CBC with platelets    Collection Time: 06/02/22  6:46 AM   Result Value Ref Range    WBC Count 5.8 4.0 - 11.0 10e3/uL    RBC Count 5.30 4.40 - 5.90 10e6/uL    Hemoglobin 16.3 13.3 - 17.7 g/dL    Hematocrit 47.1 40.0 - 53.0 %    MCV 89 78 - 100 fL    MCH 30.8 26.5 - 33.0 pg    MCHC 34.6 31.5 - 36.5 g/dL    RDW 12.5 10.0 - 15.0 %    Platelet Count 186 150 - 450 10e3/uL   Hemoglobin A1c    Collection Time: 06/02/22  6:46 AM   Result Value Ref Range    Hemoglobin A1C 5.6 0.0 - 5.6 %   Phosphorus    Collection Time: 06/02/22  6:46 AM   Result Value Ref Range    Phosphorus 2.7 2.5 - 4.5 mg/dL   Uric acid    Collection Time: 06/02/22  6:46 AM   Result Value Ref Range    Uric Acid 4.7 3.5 - 7.2 mg/dL   Lipid Profile    Collection Time: 06/02/22  6:46 AM   Result Value Ref Range    Cholesterol 202 (H) <200 mg/dL    Triglycerides 85 <150 mg/dL    Direct Measure HDL 68 >=40 mg/dL    LDL Cholesterol Calculated 117 (H) <=100 mg/dL    Non HDL Cholesterol 134 (H) <130 mg/dL    Patient Fasting > 8hrs? Yes    Comprehensive metabolic panel    Collection Time: 06/02/22  6:46 AM   Result Value Ref Range    Sodium 139 133 - 144 mmol/L    Potassium 3.5 3.4 - 5.3 mmol/L    Chloride 104 94 - 109 mmol/L    Carbon Dioxide (CO2) 27 20 - 32 mmol/L    Anion Gap 8 3 - 14 mmol/L    Urea Nitrogen 15 7 - 30 mg/dL    Creatinine 0.94 0.66 - 1.25 mg/dL    Calcium 9.0 8.5 - 10.1 mg/dL    Glucose 114 (H) 70 - 99 mg/dL    Alkaline Phosphatase 67 40 - 150 U/L    AST 29 0 - 45 U/L    ALT 43 0 - 70 U/L    Protein Total 7.2 6.8 - 8.8 g/dL    Albumin 3.9 3.4 - 5.0 g/dL    Bilirubin Total 0.8 0.2 - 1.3 mg/dL    GFR Estimate >90  >60 mL/min/1.73m2   INR    Collection Time: 06/02/22  7:10 AM   Result Value Ref Range    INR 0.90 0.85 - 1.15     Imaging:   Personally reviewed

## 2022-06-02 NOTE — LETTER
6/2/2022         RE: Jose Pimentel  [address]  [place] [address]        Dear Colleague,    Thank you for referring your patient, Jose Pimentel, to the [facility]. Please see a copy of my visit note below.      Transplant Surgery Consult Note    Medical record number: [id number]  YOB: 1960,   Consult requested by the patient for evaluation of kidney donation candidacy.    Assessment and Recommendations: Mr. Pimentel appears to be a good candidate for kidney donation at this point in the evaluation. The following issues will need to be addressed prior to formal review:    CT abdomen and pelvis with contrast to be ordered for assessment vascular anatomy: Yes  Dietician consult ordered: Yes  Social work consult ordered: Yes  CXR and EKG ordered:  Yes  Transplant donor labs ordered to include HLA, ABOx2, Cr, Iohexol GFR or Cr clearance, virology etc.       Patient would like to donate to a friend. The majority of our visit today was spent in counseling regarding the medical and surgical risks of kidney donation; the typical jhonny-and post-operative experience and recovery/return to work pattern; restrictions related to the surgery (driving; lifting; exercise).      We also talked about post-op visits and longer term health care maintenance, as well as the implications of having one remaining kidney. This discussion included, but was not limited to rates of complications such as bleeding, infection, need for transfusion, reoperation, other organ injury, future bowel obstruction, incisional hernia, port site pain, varicocele, venous thrombosis, pulmonary embolism, renal failure, and death (3 per 10,000).     We discussed long-term risks in detail.  I discussed the articles suggesting a small increase in ESKD in donors and their limitations    We discussed recovery, including length of hospital stay; no new meds other than pain meds on discharge; limitations after surgery  (no driving for a couple of weeks; no lifting over 10 lbs or exercise stretching abdominal muscles for 6 weeks;; and fatigue for the first few weeks postdonation.  I informed him of our donor survey results on donors feeling ready to drive, and on return to feeling back to normal.  We also discussed the need for maintaining a healthy lifestyle long-term after donation    We discussed the national [facility] system and the possibility of participating, if not a match for the intended recipient.  I explained how the system worked.    We discussed the increased risk for venous thrombosis for the 1st two postoperative risks.  We discussed that if flying home in the 1st 2 weeks, he should get up and walk around (after seat belt sign is off) every 30 minutes.  We also discussed needing help with his luggage. At the conclusion of the visit, all questions had been answered and Mr. Pimentel's candidacy for donation will be reviewed at our Multidisciplinary Donor Selection Committee. He will stay in contact with the nurse coordinator with any concerns.      45 min spent on the date of the encounter in chart review, patient visit,  documentation and/or discussion with other providers about the issues documented above.        Lei Hanson MD  Surgical Director, Kidney Transplantation                                                                                                      ---------------------------------------------------------------------------------------------------    HPI: Jose Pimentel is a 61 year old year old male who presents for a kidney donor evaluation.          Past Medical History:   Diagnosis Date     Borderline blood pressure      Colitis      Crohn's disease (H)      Infection due to 2019 novel coronavirus 01/2022     Nonspecific abnormal electrocardiogram (ECG) (EKG)     per pt- irregular rate     Past Surgical History:   Procedure Laterality Date     bicep reattachment       KNEE SURGERY       RUST  FIXATION OF ABDOMINAL RIB       No family history on file.  Social History     Socioeconomic History     Marital status:      Spouse name: Not on file     Number of children: Not on file     Years of education: Not on file     Highest education level: Not on file   Occupational History     Not on file   Tobacco Use     Smoking status: Never Smoker     Smokeless tobacco: Former User   Substance and Sexual Activity     Alcohol use: Yes     Drug use: Never     Sexual activity: Not on file   Other Topics Concern     Not on file   Social History Narrative     Not on file     Social Determinants of Health     Financial Resource Strain: Not on file   Food Insecurity: Not on file   Transportation Needs: Not on file   Physical Activity: Not on file   Stress: Not on file   Social Connections: Not on file   Intimate Partner Violence: Not on file   Housing Stability: Not on file       ROS:   CONSTITUTIONAL:  No fevers or chills  EYES: negative for icterus  ENT:  negative for hearing loss, tinnitus and sore throat  RESPIRATORY:  negative for cough, sputum, dyspnea  CARDIOVASCULAR:  negative for chest pain  GASTROINTESTINAL:  negative for nausea, vomiting, diarrhea or constipation  GENITOURINARY:  negative for incontinence, dysuria, bladder emptying problems  HEME:  No easy bruising  INTEGUMENT:  negative for rash and pruritus  NEURO:  Negative for headache, seizure disorder    Allergies:   Allergies   Allergen Reactions     Sulfa Drugs Hives       Medications:  Prescription Medications as of 6/6/2022       Rx Number Disp Refills Start End Last Dispensed Date Next Fill Date Owning Pharmacy    calcium citrate (CITRACAL) 950 (200 Ca) MG tablet            Sig: Take 1 tablet by mouth daily    Class: Historical    Route: Oral    Cholecalciferol 10 MCG (400 UNIT) CAPS            Class: Historical    Route: Oral    Flaxseed Oil (LINSEED OIL) OIL            Class: Historical    Route: Oral    hydrochlorothiazide (HYDRODIURIL) 25  MG tablet    10/4/2021        Sig: Take 25 mg by mouth    Class: Historical    Route: Oral    mesalamine (LIALDA) 1.2 g DR tablet    7/20/2012        Sig: Take 1,200 mg by mouth    Class: Historical    Route: Oral    Multiple Vitamin (MULTI-VITAMINS) TABS            Sig: Take 1 tablet by mouth daily    Class: Historical    Route: Oral    omega 3 1200 MG CAPS            Class: Historical    Route: Oral          Exam:      Body mass index is 29.79 kg/m .  Constitutional - A&O in NAD.   Eyes - no redness or discharge.  Sclera anicteric  Respiratory - no cough, no labored breathing  Musculoskeletal - range of motion normal  Skin - no discoloration, no jaundice  Neurological - no tremors.  No facial droop or dysarthria  Psychiatric - normal mood and affect  The rest of a comprehensive physical examination is deferred due to PHE (public health emergency) video visit restrictions     Diagnostics:   Recent Results (from the past 336 hour(s))   EBV Capsid Antibody IgG    Collection Time: 06/02/22  6:46 AM   Result Value Ref Range    EBV Capsid Melisa IgG Instrument Value >750.0 (H) <18.0 U/mL    EBV Capsid Antibody IgG Positive (A) No detectable antibody.   CMV Antibody IgG    Collection Time: 06/02/22  6:46 AM   Result Value Ref Range    CMV Melisa IgG Instrument Value >10.00 (H) <0.60 U/mL    CMV Antibody IgG Positive, suggests recent or past exposure. (A) No detectable antibody.    Prostate spec antigen screen  for men over 50    Collection Time: 06/02/22  6:46 AM   Result Value Ref Range    Prostate Specific Antigen Screen 0.35 0.00 - 4.00 ug/L   West Nile Virus Antibody IgG IgM    Collection Time: 06/02/22  6:46 AM   Result Value Ref Range    West Nile IgG Serum 0.53 <=1.29 IV    West Nile IgM Serum 0.00 <=0.89 IV   Treponema Abs w Reflex to RPR and Titer    Collection Time: 06/02/22  6:46 AM   Result Value Ref Range    Treponema Antibody Total Nonreactive Nonreactive   HIV Antigen Antibody Combo Pretransplant    Collection  Time: 06/02/22  6:46 AM   Result Value Ref Range    HIV Antigen Antibody Combo Pretransplant Nonreactive Nonreactive   Hepatitis C antibody    Collection Time: 06/02/22  6:46 AM   Result Value Ref Range    Hepatitis C Antibody Reactive (A) Nonreactive   Hepatitis B surface antigen    Collection Time: 06/02/22  6:46 AM   Result Value Ref Range    Hepatitis B Surface Antigen Nonreactive Nonreactive   Hepatitis B Surface Antibody    Collection Time: 06/02/22  6:46 AM   Result Value Ref Range    Hepatitis B Surface Antibody 0.00 <8.00 m[IU]/mL   Hepatitis B core antibody    Collection Time: 06/02/22  6:46 AM   Result Value Ref Range    Hepatitis B Core Antibody Total Nonreactive Nonreactive   Protein  random urine    Collection Time: 06/02/22  6:46 AM   Result Value Ref Range    Total Protein Random Urine g/L 0.07 g/L    Total Protein Urine g/gr Creatinine 0.08 0.00 - 0.20 g/g Cr    Creatinine Urine mg/dL 85 mg/dL   Albumin Random Urine Quantitative with Creat Ratio    Collection Time: 06/02/22  6:46 AM   Result Value Ref Range    Creatinine Urine mg/dL 85 mg/dL    Albumin Urine mg/L 5 mg/L    Albumin Urine mg/g Cr 5.88 0.00 - 17.00 mg/g Cr   Routine UA with microscopic    Collection Time: 06/02/22  6:46 AM   Result Value Ref Range    Color Urine Yellow Colorless, Straw, Light Yellow, Yellow    Appearance Urine Clear Clear    Glucose Urine Negative Negative mg/dL    Bilirubin Urine Negative Negative    Ketones Urine Negative Negative mg/dL    Specific Gravity Urine 1.012 1.003 - 1.035    Blood Urine Negative Negative    pH Urine 6.0 5.0 - 7.0    Protein Albumin Urine Negative Negative mg/dL    Urobilinogen Urine Normal Normal, 2.0 mg/dL    Nitrite Urine Negative Negative    Leukocyte Esterase Urine Negative Negative    RBC Urine 1 <=2 /HPF    WBC Urine <1 <=5 /HPF   CBC with platelets    Collection Time: 06/02/22  6:46 AM   Result Value Ref Range    WBC Count 5.8 4.0 - 11.0 10e3/uL    RBC Count 5.30 4.40 - 5.90 10e6/uL     Hemoglobin 16.3 13.3 - 17.7 g/dL    Hematocrit 47.1 40.0 - 53.0 %    MCV 89 78 - 100 fL    MCH 30.8 26.5 - 33.0 pg    MCHC 34.6 31.5 - 36.5 g/dL    RDW 12.5 10.0 - 15.0 %    Platelet Count 186 150 - 450 10e3/uL   Hemoglobin A1c    Collection Time: 06/02/22  6:46 AM   Result Value Ref Range    Hemoglobin A1C 5.6 0.0 - 5.6 %   Phosphorus    Collection Time: 06/02/22  6:46 AM   Result Value Ref Range    Phosphorus 2.7 2.5 - 4.5 mg/dL   Uric acid    Collection Time: 06/02/22  6:46 AM   Result Value Ref Range    Uric Acid 4.7 3.5 - 7.2 mg/dL   Lipid Profile    Collection Time: 06/02/22  6:46 AM   Result Value Ref Range    Cholesterol 202 (H) <200 mg/dL    Triglycerides 85 <150 mg/dL    Direct Measure HDL 68 >=40 mg/dL    LDL Cholesterol Calculated 117 (H) <=100 mg/dL    Non HDL Cholesterol 134 (H) <130 mg/dL    Patient Fasting > 8hrs? Yes    Comprehensive metabolic panel    Collection Time: 06/02/22  6:46 AM   Result Value Ref Range    Sodium 139 133 - 144 mmol/L    Potassium 3.5 3.4 - 5.3 mmol/L    Chloride 104 94 - 109 mmol/L    Carbon Dioxide (CO2) 27 20 - 32 mmol/L    Anion Gap 8 3 - 14 mmol/L    Urea Nitrogen 15 7 - 30 mg/dL    Creatinine 0.94 0.66 - 1.25 mg/dL    Calcium 9.0 8.5 - 10.1 mg/dL    Glucose 114 (H) 70 - 99 mg/dL    Alkaline Phosphatase 67 40 - 150 U/L    AST 29 0 - 45 U/L    ALT 43 0 - 70 U/L    Protein Total 7.2 6.8 - 8.8 g/dL    Albumin 3.9 3.4 - 5.0 g/dL    Bilirubin Total 0.8 0.2 - 1.3 mg/dL    GFR Estimate >90 >60 mL/min/1.73m2   Quantiferon TB Gold Plus Grey Tube    Collection Time: 06/02/22  6:46 AM    Specimen: Arm, Right; Blood   Result Value Ref Range    Quantiferon Nil Tube 0.03 IU/mL   Quantiferon TB Gold Plus Green Tube    Collection Time: 06/02/22  6:46 AM    Specimen: Arm, Right; Blood   Result Value Ref Range    Quantiferon TB1 Tube 0.03 IU/mL   Quantiferon TB Gold Plus Yellow Tube    Collection Time: 06/02/22  6:46 AM    Specimen: Arm, Right; Blood   Result Value Ref Range     Quantiferon TB2 Tube 0.03    Quantiferon TB Gold Plus Purple Tube    Collection Time: 06/02/22  6:46 AM    Specimen: Arm, Right; Blood   Result Value Ref Range    Quantiferon Mitogen 10.00 IU/mL   Adult Type and Screen    Collection Time: 06/02/22  6:46 AM   Result Value Ref Range    ABO/RH(D) O POS     Antibody Screen      SPECIMEN EXPIRATION DATE [id number]    Quantiferon TB Gold Plus    Collection Time: 06/02/22  6:46 AM    Specimen: Arm, Right; Blood   Result Value Ref Range    Quantiferon-TB Gold Plus Negative Negative    TB1 Ag minus Nil Value 0.00 IU/mL    TB2 Ag minus Nil Value 0.00 IU/mL    Mitogen minus Nil Result 9.97 IU/mL    Nil Result 0.03 IU/mL   ABO and Rh 2nd type and screen required    Collection Time: 06/02/22  7:10 AM   Result Value Ref Range    ABO/RH(D) O POS     SPECIMEN EXPIRATION DATE [id number]    INR    Collection Time: 06/02/22  7:10 AM   Result Value Ref Range    INR 0.90 0.85 - 1.15   EKG 12-lead complete w/read - Clinics    Collection Time: 06/02/22 11:22 AM   Result Value Ref Range    Systolic Blood Pressure  mmHg    Diastolic Blood Pressure  mmHg    Ventricular Rate 50 BPM    Atrial Rate 50 BPM    SC Interval 216 ms    QRS Duration 104 ms     ms    QTc 423 ms    P Axis 51 degrees    R AXIS 17 degrees    T Axis 147 degrees    Interpretation ECG       Sinus bradycardia with 1st degree A-V block  Possible Left atrial enlargement  ST & T wave abnormality, consider anterolateral ischemia  Abnormal ECG  No previous ECGs available  Confirmed by MD INA, EDUARDO ([id number]) on 6/3/2022 6:46:14 AM             Again, thank you for allowing me to participate in the care of your patient.        Sincerely,        Lei Hanson MD

## 2022-06-02 NOTE — PROGRESS NOTES
Living Kidney Donor Consent per OPTN Policy 14.2 for Independent Living Donor Advocate (MARILYN)    Organ Type: Unrelated Kidney Donor  Presenting Information:  Jose Pimentel presents to [facility] to complete a living donor evaluation since he is interested in becoming a kidney donor for for his college bernice and former football bernice, Alexandro Simpson.  He flew here today from [place] for this evaluation.  He has been approved for [facility].      Written assurance has been obtained from the potential donor that he/she:   Is willing to donate  Is free from inducement and coercion  Has been informed that the he/she may decline to donate at any time  Has been informed that transplant centers must:   A) Offer donors an opportunity to discontinue the donor consent or evaluation process in a way that is protected and confidential  B) Provide an independent living donor advocate (MARILYN) to assist the potential donor during this process    The following was presented to the potential donor in a language in which the potential donor is able to engage in meaningful dialogue:   Education and instruction about all phases of the living donation process including:   Consent  Medical and psychosocial evaluation  Information about the surgical procedure  Pre and post operative care  Benefits of post operative follow up  Disclosure that the recovery hospital will take all reasonable precautions to provide confidentiality for the donor/recipient  Disclosure that it is a federal crime for any person to knowingly acquire, obtain or otherwise transfer any human organ for valuable consideration  Disclosure that the recovery hospital must provide an independent living donor advocate (MARILYN)  Disclosure that health information obtained during the evaluation is subject to the same regulations as all records and could reveal conditions that must be reported to local, state,  or federal public health authorities  Disclosure that the [facility] is required to report living donor follow up information at 6 months, 1 year, and 2 years, and that the potential donor must commit to post operative follow up testing coordinated by the [facility]    Disclosure has been provided that these risks may be transient or permanent & include but are not limited to:  Potential psychosocial risks:  Problems with body image  Post-surgery depression or anxiety  Feelings of emotional distress or bereavement if recipient experiences any recurrent disease or in the event of the recipient s death  Impact of donation on the donor s lifestyle, such as limited ability to exercise in the short term post operative recovery period, no driving for the first 2 weeks post op or until the donor is no longer needing pain medications that impair the ability to drive.      Potential financial impacts:  Personal expenses of travel, housing, , lost wages related to donation might not be reimbursed. However, resources may be available to defray some donation-related expenses.   Need for life-long follow up at the donor s expense  Loss of employment or income  Negative impact on the ability to obtain future employment  Negative impact on the ability to obtain, maintain, or afford health, disability, and life insurance  Future health problems experienced by living donors following donation may not be covered by the recipient s insurance      PREPARATION FOR DONATION, RECOVERY, AND POTENTIAL SHORT-LONG-TERM OUTCOMES:  Understanding of the Living Donation Process:  We discussed the role of Independent Living Donor Advocate.  Short and long term medical and psychosocial risks to both, donor and recipient were reviewed and he is capable of understanding the risks.  Post surgical restrictions (2 weeks no driving, 6 weeks no lifting over 10 lbs) were reviewed and he appears capable of adhering to the post  surgical requirements. The need for a caregiver was discussed and his wife will care for him .  The risk of poor psychosocial outcome including problems with body image, post-surgery depression or anxiety, or feelings of emotional distress or bereavement if recipient experiences any recurrent disease, poor outcome or death was reviewed.  Additionally, potential financial implications, including the risk of having difficulty obtaining health care insurance, life insurance, disability insurance, or long term care insurance were reviewed, as were available donor grants to assist with donor related expenses.      IMPRESSIONS/RECOMMENDATIONS:  Jose Pimentel appears highly motivated to donate a kidney to his college friend, Alexandro Simpson.  He appears capable of understanding this information and making an informed medical decision.  As MARILYN, no concerns were identified today.  He has my contact information and is aware that I am available thoughout the donation process.    Contact Information:  RAMYA BECERRA, [facility]  Clinical  and Independent Donor Advocate  [employer]  Phone - [phone]  Pager - [phone]  [email or web address]      Time Spent: 30 minutes

## 2022-06-02 NOTE — PROGRESS NOTES
"Psychosocial Evaluation  Living Organ Donation per OPTN Policy 14.1.A  Organ Type: living kidney donor   Presenting Information: Jose presents to the [facility] to complete a psychosocial evaluation since he is interested in becoming a living kidney donor for his friend, Alexandro.    PERSONAL BACKGROUND:  Current Living Situation: Jose lives in [facility] with his wife, Jacquelyn.     Education/Employment/Financial Situation: Jose has a Master's degree and works as a  full time. He has an assistant  that could assist at the time of donation and during recovery. He is not worried about being off of work, but would be interested in wage reimbursement. He does have NLDAC in place.     Health Insurance Status: He has a \"bill share plan\" where he submits bills if he has them. Does not have standard health insurance.     Family History: Jose is  and has 5 kids and 10 grandkids. He grew up in [place] and now lives in [place].     General Health: Jose doctors as a practice that he pays a monthly fee and is seen as much as he needs (Dr. Centeno). He states that he is a healthy person in general. He has not filled out health care directives.     Mental Health: The donor denies any past or present treatment for mental health issues, such as anxiety, depression, bipolar disorder, or disorders of thought such as schizophrenia or schizoaffective disorder.  There is no history of personality disorder or eating disorders.  The donor denies any need to see a counselor or therapist at this time.  The donor denies any past suicidal ideation, plans, or past attempts.  The donor denies any use of psychotropic medications at this time or in the past.  The donor denies any past history of hospitalization for psychiatric illnesses.  The donor denies any past history of ADHD or ADD.  The donor denies any history of educational issues or need for special " "educational services in their past history.    Alcohol and Drug Use/Abuse/Dependency: He reports that he consumes approximately 1-2 servings of alcohol per week ( a serving is defined here as one, 12 oz beer, or one 4 oz glass of wine, or one 1 /2 oz of hard liquor).  The donor denies any past history of abuse or dependency on alcohol or illicit drugs. The donor denies any current use of street drugs, including marijuana, vaping, edible marijuana, or other mood altering substances.  The donor denies any past history of negative consequences of use of alcohol or drugs such as a DUI, relationship problems, problems with fulfilling parenting or other care giving responsibilities, or problems with work performance.       Cigarette Use: Denies    Legal: Denies    Coping with major surgery/associated stress: cedrick by praying, walking, working out and talking to people.     Support System: His wife would be his support system. She would travel with him to [place] and stay during his recovery.     DONOR SPECIFIC INFORMATION:  Relationship to Recipient: friend of recipient     Decision Process/Motivation to Donate: Jose states that he talks to God and has been praying for Alexandro for healing-- it then dawned on him that \"why was he praying if he was not willing to come forward himself.\" This made him take the step to come forward to be evaluated. He used to play football with Alexandro and states he is \"a great wilian.\" He does not have worries about the surgery.     High risk behaviors as defined by [place] Public Health Services (PHS) that have potential to increase risk of disease transmission were reviewed and no risks identified.     PREPARATION FOR DONATION, RECOVERY, AND POTENTIAL SHORT-LONG-TERM OUTCOMES:  Understanding of the Living Donation Process:  We discussed the role of living donor .  Short and long term medical and psychosocial risks to both, donor and recipient were reviewed and he expressed understanding.  Post " surgical restrictions (2 weeks no driving, 6 weeks no lifting over 10 lbs) were reviewed and he appears capable of adhering to the post surgical requirements. The need for a caregiver was discussed and his wife can provide support.  The risk of poor psychosocial outcome including problems with body image, post-surgery depression or anxiety, or feelings of emotional distress or bereavement if recipient experiences any recurrent disease, poor outcome or death was reviewed.  Additionally, potential financial implications, including the risk of having difficulty obtaining health care insurance, life insurance, disability insurance, or long term care insurance were reviewed, as were available donor grants to assist with donor related expenses.      We also discussed some unique issues that arise with paired kidney donation, which include the uncertainty of the timing and the importance of having a employment situation and support system that is able to provide sustained support and flexibility.    He appears capable of understanding this information and making an informed medical decision.    Impressions/Recommendations:   Jose  is highly motivated to donate a kidney to his friend.  His decision to donate is free of inducement, coercion, or other undue pressure.   His housing, finances and employment are stable.  No current/active mental health or chemical abuse issues were identified.  The need for a caregiver was reviewed and he is able to identify a plan to meet his post operative care needs.  He appears capable of making an informed medical decision.  No psychosocial contraindications to living organ donation were identified and  I support Jose s desire to donate a kidney to his friend, Alexandro.       Contact Information:   Jessica Dominguez [facility], CCTSW   Living Donor   [facility], [facility], [facility]  Direct: [phone]  E-Mail:  [email or web address]      Time Spent: 40 minutes

## 2022-06-02 NOTE — LETTER
6/2/2022       RE: Jose Pimentel  [address]  [place] [place] [facility] [address]     Dear Colleague,    Thank you for referring your patient, Jose Pimentel, to the [facility] [place] [facility]. Please see a copy of my visit note below.    TRANSPLANT NEPHROLOGY DONOR EVALUATION    Assessment and Plan:    # Prospective Kidney Transplant Donor: Patient with several issues that need to be addressed prior to donation. Patient's blood pressure is acceptable at this visit, kidney function appears to be acceptable with Iohexol performed, and urinalysis is bland.    Despite having Crohn's disease, his disease appears quiescent/well managed on mesalamine in the context of excellent kidney function, particularly when looking at raw function as he is a large man (6'3, 238 lbs with kidneys both over 220cc). He has never had kidney stones, has no stones on imaging.     While he does have mild hypercalciuria, his linkolink is otherwise favorable. This was on hydrochlorothiazide. He could benefit from reducing sodium in his diet as well both for stone and blood pressure risk. Would check PTH, vitamin D, calcitriol. May have incomplete/partial RTA, idiopathic hypercalciuria leading to this. Oxalate within normal limits.     Has EKG abnormalities. Fairly active without known ischemia, dysrhythmia. Would benefit from Cardiology opinion     He has a positive Hepatitis C antibody test. Needs HCV PCR.     Though hyperglycemic, not overt diabetes, age, relatively low risk for progression.       #Ileocolonic Crohn's Disease, Strong family history of colonic neoplasia   -Crohn's quiescent with 4 colonoscopies since 2011 most recent 12/8/2021 without signs of active colitis, or evidence of dysplasia.   -Plan to repeat colonoscopy every 3-5 years     #EKG abnormalities with hypertension on medications, abnormal rhythm.   -Stress Echocardiogram   -Obtain prior cardiology  notes with reported dysrhythmia history   -If unrevealing, cardiology consult.     #Hepatitis C antibody reactivity   -HCV PCR     #Hypercalciuria   -He is normocalcemic; would check PTH, calcitriol level     -He has had hypokalemia, variable bicarbonate levels (though usually high) and urine pH >5.3. He could have an incomplete/partial RTA, idiopathic hypercalciuria as well.     -Reducing sodium intake will help with hypercalciuria     -May benefit from reducing animal protein in diet as well as he has hyperuricosuria.    #Hyperglycemia on fasting blood glucose and hemoglobin A1c 5.6  -Not overtly diabetic   -8 Year Estimated Risk of T2DM = 4%  -Could be in part related to thiazide, though effect mitigated by potassium repletion.     Kristian T, Dilshad LJ, Jimmy ADLER 3rd, Td LEAL, Juan J RS. Changes in serum potassium mediate thiazide-induced diabetes. Hypertension. 2008 Dec;52(6):1022-9. doi: 10.1161/[id number]. Epub 2008 Nov 3. Erratum in: Hypertension. 2009 Feb;53(2):e19. PMID: [id number]; PMCID: [id number].     #Mixed hyperlipidemia   -May benefit from statin, particularly if a donor candidate     #Surgical/Anatomical   -247/476 = 51.9%; renal cysts noted  -229/476 = 48.1%  Left kidney, appreciate surgery CT review   Also with left inguinal hernia    #Psychosocial   -No concerns for living donation     Discussed the risks of donating a kidney, including the surgical risk and the possible risks of living with one kidney.    Education about expected post-donation kidney function and how chronic kidney disease (CKD) and end stage kidney disease (ESKD) might potentially impact the donor in the future, include, but not limited to:       - On average, donors will have 25-35% permanent loss of kidney function at donation.       - Baseline risk of ESKD may slightly exceed that of members of the general population with the same demographic profile.       - Donor risks must be interpreted in light of known  epidemiology of both CKD or ESKD, such as that CKD generally develops in midlife (40-50 years old) and ESKD generally develops after age 60.       - Medical evaluation of young potential donors cannot predict lifetime risk of CKD or ESKD.       - Donors may be at higher risk for CKD if they sustain damage to the remaining kidney.       - Development of CKD and progression of ESKD may be more rapid with only 1 kidney.       - Some type of kidney replacement therapy, either kidney transplant or dialysis, is required when reaching ESKD.       - I described to him absolute and relative risk of ESKD based on the work of Agnes et al. I noted that this is an extrapolation from 15 years to lifetime risk. I also described safety net and how his donation would lead to accrual of wait time were he to need a kidney transplant.       Potential medical or surgical risks include, but not limited to:       - Death.       - Scars, pain, fatigue, and other consequences typical of any surgical procedure.       - Decreased kidney function.       - Abdominal or bowel symptoms, such as bloating and nausea, and developing bowel obstruction.       - Kidney failure (ESKD) and the need for a kidney transplant or dialysis for the donor.       - Impact of obesity, hypertension, or other donor-specific medical conditions on morbidity and mortality of the potential donor.    Patients overall evaluation will be discussed with the transplant team and a final recommendation on the patients' suitability to be a kidney transplant donor will be made at that time.    Consult:  Jose Pimentel was seen in consultation at the request of Dr. Lei Hanson for evaluation as a potential kidney transplant donor.    Reason for Visit:  Jose Pimentel is a 61 year old male who presents for a kidney donor evaluation.  Patient would like to donate to his friend.  He would be interested in paired exchange if needed       Present Condition and Donor-Related  "Medical History:         Kidney Disease Hx:       h/o Kidney Problems: No  Family h/o Genetic Kidney Disease: No       h/o Hypertension: Yes; been on hydrochlorothiazide for 4 years. Only taking 12.5mg for awhile. Wasn't able to work out and put him back on 25mg daily. He has been working out. Haven't measured Bps in awhile.       Usual Blood Pressure: Doesn't know       h/o Protein in Urine: No  h/o Blood in Urine: No       h/o Kidney Stones: No  h/o Kidney Injury: No       h/o Recurrent UTI: No  h/o Genitourinary Problems: No       h/o Chronic NSAID Use: No         Other Medical Hx:       h/o Diabetes: No             h/o Gastrointestinal, Pancreas or Liver Problems: Yes: been on mesalamine for 14 years; Crohn's disease, last flare \"years\"/at least 12 years        h/o Lung or Heart Problems: No; he has arrhythmia. Went to a cardiologist. Not atrial fibrillation.        h/o Hematologic Problems: No  h/o Bleeding or Clotting Problems: No       h/o Cancer: No       h/o Infection Problems: No               Skin Cancer Risk:       Sees Dermatologist; has had borderline. Monitor every 6 months        h/o more than 50 moles: No       h/o extensive sun exposure: Yes        h/o melanoma: No       Family h/o melanoma: No         Mental Health Assessment:       h/o Depression: No       h/o Psychiatric Illness: No       h/o Suicidal Attempt(s): No    COVID Status:  Vaccination Up To Date: Yes  H/o COVID Infection: Yes     Review Of Systems:   A comprehensive review of systems was obtained and negative, except as noted in the HPI or PMH.    Past Medical History:   History was taken from the patient as noted below.  Past Medical History:   Diagnosis Date     Borderline blood pressure      Colitis      Crohn's disease (H)      Infection due to 2019 novel coronavirus 01/2022     Nonspecific abnormal electrocardiogram (ECG) (EKG)     per pt- irregular rate       Past Surgery History:   Past Surgical History:   Procedure Laterality " Date     bicep reattachment       KNEE SURGERY       ZZC FIXATION OF ABDOMINAL RIB       Personal or family history of anesthesia problems: No    Family History:   No family history on file.       Specific Family History in First Degree Relatives:       FH of Kidney Dz: No FH of Diabetes: Yes; diabetes. Brother hypoglycemia.  Not on insulin.        FH of Hypertension: Yes, Marcelino.   FH of CAD: No       FH of Cancer: Yes, mom  of pancreatic cancer. Uncles  of cancer. FH of Kidney Cancer: No    Personal History:    Non-smoking.   Dipped for 2 years but stopped years ago.     Social History     Socioeconomic History     Marital status:      Spouse name: Not on file     Number of children: Not on file     Years of education: Not on file     Highest education level: Not on file   Occupational History     Not on file   Tobacco Use     Smoking status: Never Smoker     Smokeless tobacco: Former User   Substance and Sexual Activity     Alcohol use: Yes     Drug use: Never     Sexual activity: Not on file   Other Topics Concern     Not on file   Social History Narrative     Not on file     Social Determinants of Health     Financial Resource Strain: Not on file   Food Insecurity: Not on file   Transportation Needs: Not on file   Physical Activity: Not on file   Stress: Not on file   Social Connections: Not on file   Intimate Partner Violence: Not on file   Housing Stability: Not on file          Specific Social History:       Health Insurance Status: Yes       Employment Status: Full time  Occupation:                         Living Arrangements: lives with their spouse       Social Support: Yes       Presence of increased risk for disease transmission behaviors as defined by PHS guidelines: No        Allergies:  Allergies   Allergen Reactions     Sulfa Drugs Hives       Medications:  Current Outpatient Medications   Medication Sig     calcium citrate (CITRACAL) 950 (200 Ca) MG tablet Take 1 tablet by mouth  "daily     Cholecalciferol 10 MCG (400 UNIT) CAPS      Flaxseed Oil (LINSEED OIL) OIL      hydrochlorothiazide (HYDRODIURIL) 25 MG tablet Take 25 mg by mouth     mesalamine (LIALDA) 1.2 g DR tablet Take 1,200 mg by mouth     Multiple Vitamin (MULTI-VITAMINS) TABS Take 1 tablet by mouth daily     omega 3 1200 MG CAPS      No current facility-administered medications for this visit.     There are no discontinued medications.    Also takes glucosamine-chondrontin    Vitals:  Vital Signs 6/2/2022 6/2/2022 6/2/2022   Systolic 124 126 126   Diastolic 79 77 77   Pulse - - 82   Temperature - - -   Respirations - - -   Weight (LB) - - 238 lb 5.1 oz   Height - - 6' 3\"   BMI (Calculated) - - 29.79   Pain Score - - -   O2 - - 99       Exam:   GENERAL APPEARANCE: alert and no distress  HENT: mouth without ulcers or lesions  LYMPHATICS: no cervical or supraclavicular nodes  RESP: lungs clear to auscultation - no rales, rhonchi or wheezes  CV: regular rhythm, normal rate, no rub, no murmur  EDEMA: no LE edema bilaterally  ABDOMEN: soft, nondistended, nontender, bowel sounds normal  MS: extremities normal - no gross deformities noted, no evidence of inflammation in joints, no muscle tenderness  SKIN: no rash    Results:   Labs and imaging were ordered for this visit and reviewed by me.  Recent Results (from the past 336 hour(s))   Prostate spec antigen screen  for men over 50    Collection Time: 06/02/22  6:46 AM   Result Value Ref Range    Prostate Specific Antigen Screen 0.35 0.00 - 4.00 ug/L   Protein  random urine    Collection Time: 06/02/22  6:46 AM   Result Value Ref Range    Total Protein Random Urine g/L 0.07 g/L    Total Protein Urine g/gr Creatinine 0.08 0.00 - 0.20 g/g Cr    Creatinine Urine mg/dL 85 mg/dL   Albumin Random Urine Quantitative with Creat Ratio    Collection Time: 06/02/22  6:46 AM   Result Value Ref Range    Creatinine Urine mg/dL 85 mg/dL    Albumin Urine mg/L 5 mg/L    Albumin Urine mg/g Cr 5.88 0.00 - " 17.00 mg/g Cr   Routine UA with microscopic    Collection Time: 06/02/22  6:46 AM   Result Value Ref Range    Color Urine Yellow Colorless, Straw, Light Yellow, Yellow    Appearance Urine Clear Clear    Glucose Urine Negative Negative mg/dL    Bilirubin Urine Negative Negative    Ketones Urine Negative Negative mg/dL    Specific Gravity Urine 1.012 1.003 - 1.035    Blood Urine Negative Negative    pH Urine 6.0 5.0 - 7.0    Protein Albumin Urine Negative Negative mg/dL    Urobilinogen Urine Normal Normal, 2.0 mg/dL    Nitrite Urine Negative Negative    Leukocyte Esterase Urine Negative Negative    RBC Urine 1 <=2 /HPF    WBC Urine <1 <=5 /HPF   CBC with platelets    Collection Time: 06/02/22  6:46 AM   Result Value Ref Range    WBC Count 5.8 4.0 - 11.0 10e3/uL    RBC Count 5.30 4.40 - 5.90 10e6/uL    Hemoglobin 16.3 13.3 - 17.7 g/dL    Hematocrit 47.1 40.0 - 53.0 %    MCV 89 78 - 100 fL    MCH 30.8 26.5 - 33.0 pg    MCHC 34.6 31.5 - 36.5 g/dL    RDW 12.5 10.0 - 15.0 %    Platelet Count 186 150 - 450 10e3/uL   Hemoglobin A1c    Collection Time: 06/02/22  6:46 AM   Result Value Ref Range    Hemoglobin A1C 5.6 0.0 - 5.6 %   Phosphorus    Collection Time: 06/02/22  6:46 AM   Result Value Ref Range    Phosphorus 2.7 2.5 - 4.5 mg/dL   Uric acid    Collection Time: 06/02/22  6:46 AM   Result Value Ref Range    Uric Acid 4.7 3.5 - 7.2 mg/dL   Lipid Profile    Collection Time: 06/02/22  6:46 AM   Result Value Ref Range    Cholesterol 202 (H) <200 mg/dL    Triglycerides 85 <150 mg/dL    Direct Measure HDL 68 >=40 mg/dL    LDL Cholesterol Calculated 117 (H) <=100 mg/dL    Non HDL Cholesterol 134 (H) <130 mg/dL    Patient Fasting > 8hrs? Yes    Comprehensive metabolic panel    Collection Time: 06/02/22  6:46 AM   Result Value Ref Range    Sodium 139 133 - 144 mmol/L    Potassium 3.5 3.4 - 5.3 mmol/L    Chloride 104 94 - 109 mmol/L    Carbon Dioxide (CO2) 27 20 - 32 mmol/L    Anion Gap 8 3 - 14 mmol/L    Urea Nitrogen 15 7 - 30  mg/dL    Creatinine 0.94 0.66 - 1.25 mg/dL    Calcium 9.0 8.5 - 10.1 mg/dL    Glucose 114 (H) 70 - 99 mg/dL    Alkaline Phosphatase 67 40 - 150 U/L    AST 29 0 - 45 U/L    ALT 43 0 - 70 U/L    Protein Total 7.2 6.8 - 8.8 g/dL    Albumin 3.9 3.4 - 5.0 g/dL    Bilirubin Total 0.8 0.2 - 1.3 mg/dL    GFR Estimate >90 >60 mL/min/1.73m2   INR    Collection Time: 06/02/22  7:10 AM   Result Value Ref Range    INR 0.90 0.85 - 1.15     Imaging:   Personally reviewed     Again, thank you for allowing me to participate in the care of your patient.      Sincerely,    Titus Chowdary MD

## 2022-06-02 NOTE — NURSING NOTE
Chief Complaint   Patient presents with     Blood Draw     Labs drawn with PIV start by RN. Vitals taken.     Labs drawn with PIV start by RN. Pt tolerated well. Vitals taken. Pt checked into next appointment.    Olya Oro RN

## 2022-06-02 NOTE — NURSING NOTE
Saw Jose in clinic on 22 for Living Kidney Donor Evaluation.     He is interested in donation for a friend.    I provided a folder which included copies of the followin. Living Kidney Donor Evaluation Consent  2. Paired Exchange Consent  3. Donor Shield Pamphlet  4. Living Donor Collective Study information  5. Kidney for Life pamphlet  6. Kidney Donors are Heroes! Study synopsis  7. Most current SRTR data.      I also provided a parking pass and food voucher.      I reviewed the Living Kidney Donor Evaluation Consent, dated 2020 and Paired Exchange/ NDD consent dated 2018.  I answered any question.    Evaluation Notes:  Need cardiology records- Jose will request they be faxed here  Internal collected and sent

## 2022-06-02 NOTE — LETTER
6/2/2022         RE: Jose Pimentel  [address]  [facility] [address]        Dear Colleague,    Thank you for referring your patient, Jose Pimentel, to the [facility] TRANSPLANT [facility]. Please see a copy of my visit note below.    Living Kidney Donor Consent per OPTN Policy 14.2 for Independent Living Donor Advocate (MARILYN)    Organ Type: Unrelated Kidney Donor  Presenting Information:  Jose Pimentel presents to [facility] to complete a living donor evaluation since he is interested in becoming a kidney donor for for his college bernice and former football bernice, Alexandro Simpson.  He flew here today from [place] for this evaluation.  He has been approved for [facility].      Written assurance has been obtained from the potential donor that he/she:   Is willing to donate  Is free from inducement and coercion  Has been informed that the he/she may decline to donate at any time  Has been informed that transplant centers must:   A) Offer donors an opportunity to discontinue the donor consent or evaluation process in a way that is protected and confidential  B) Provide an independent living donor advocate (MARILYN) to assist the potential donor during this process    The following was presented to the potential donor in a language in which the potential donor is able to engage in meaningful dialogue:   Education and instruction about all phases of the living donation process including:   Consent  Medical and psychosocial evaluation  Information about the surgical procedure  Pre and post operative care  Benefits of post operative follow up  Disclosure that the recovery hospital will take all reasonable precautions to provide confidentiality for the donor/recipient  Disclosure that it is a federal crime for any person to knowingly acquire, obtain or otherwise transfer any human organ for valuable consideration  Disclosure that the recovery  hospital must provide an independent living donor advocate (MARILYN)  Disclosure that health information obtained during the evaluation is subject to the same regulations as all records and could reveal conditions that must be reported to local, state, or federal public health authorities  Disclosure that the [facility] is required to report living donor follow up information at 6 months, 1 year, and 2 years, and that the potential donor must commit to post operative follow up testing coordinated by the [facility]    Disclosure has been provided that these risks may be transient or permanent & include but are not limited to:  Potential psychosocial risks:  Problems with body image  Post-surgery depression or anxiety  Feelings of emotional distress or bereavement if recipient experiences any recurrent disease or in the event of the recipient s death  Impact of donation on the donor s lifestyle, such as limited ability to exercise in the short term post operative recovery period, no driving for the first 2 weeks post op or until the donor is no longer needing pain medications that impair the ability to drive.      Potential financial impacts:  Personal expenses of travel, housing, , lost wages related to donation might not be reimbursed. However, resources may be available to defray some donation-related expenses.   Need for life-long follow up at the donor s expense  Loss of employment or income  Negative impact on the ability to obtain future employment  Negative impact on the ability to obtain, maintain, or afford health, disability, and life insurance  Future health problems experienced by living donors following donation may not be covered by the recipient s insurance      PREPARATION FOR DONATION, RECOVERY, AND POTENTIAL SHORT-LONG-TERM OUTCOMES:  Understanding of the Living Donation Process:  We discussed the role of Independent Living Donor Advocate.  Short and long term medical and  psychosocial risks to both, donor and recipient were reviewed and he is capable of understanding the risks.  Post surgical restrictions (2 weeks no driving, 6 weeks no lifting over 10 lbs) were reviewed and he appears capable of adhering to the post surgical requirements. The need for a caregiver was discussed and his wife will care for him .  The risk of poor psychosocial outcome including problems with body image, post-surgery depression or anxiety, or feelings of emotional distress or bereavement if recipient experiences any recurrent disease, poor outcome or death was reviewed.  Additionally, potential financial implications, including the risk of having difficulty obtaining health care insurance, life insurance, disability insurance, or long term care insurance were reviewed, as were available donor grants to assist with donor related expenses.      IMPRESSIONS/RECOMMENDATIONS:  Jose Pimentel appears highly motivated to donate a kidney to his college friend, Alexandro Simpson.  He appears capable of understanding this information and making an informed medical decision.  As MARILYN, no concerns were identified today.  He has my contact information and is aware that I am available thoughout the donation process.    Contact Information:  RAMYA BECERRA, TOMI  Clinical  and Independent Donor Advocate  [facility]  Phone - [phone]  Pager - [phone]  [email or web address]      Time Spent: 30 minutes        Again, thank you for allowing me to participate in the care of your patient.        Sincerely,        TOMI Sarmiento

## 2022-06-02 NOTE — LETTER
"    6/2/2022         RE: Jose Pimentel  [address] [address]        Dear Colleague,    Thank you for referring your patient, Jose Pimentel, to the [facility]. Please see a copy of my visit note below.    Iohexol Timed Test Nursing Note    Jose Pimentel comes to [facility] today for a Iohexol GFR Timed test.   Orders from  were completed.    Progress Note  Height: 6'3\"  Weight: 108.3kg  Age: 61  Gender: male    The following information was verified with the patient:  *Female patients are not pregnant or could not have become recently pregnant: YES, No, Not applicable  *Is there a history of allergy (skin rash, swelling, ect) to :   a. Iodine (except skin reactions to Betadine): NO   b. Intravenous radio-contrast agents: NO   c. Seafood: NO     RN provided patient with educational handout regarding timed test. YES    Medication administered :   Iohexol (Omnipaque 140 mg Iodine/ ml concentration) 10 mls.  Site administered PIV to left upper arm  Start time 0758   Stop time 0800    Blood draws to be taken from PIV in right upper arm at 2 hr and 4 hr post iohexol in transplant clinic.      Patient tolerated the procedure well    Vitals were reviewed   /83 (BP Location: Right arm, Patient Position: Sitting, Cuff Size: Adult Large)   Pulse 54   Temp 97.4  F (36.3  C) (Oral)   Resp 16   Ht 1.905 m (6' 3\")   Wt 108.3 kg (238 lb 11.2 oz)   SpO2 100%   BMI 29.84 kg/m      Discharge Plan    Discharge instructions reviewed with patient: YES  Discharge papers printed and given to patient: YES  Patient/Representative verbalized understanding, all questions answered: YES    Discharged from unit at 0805 with whom: self to transplant clinic.    Justyna Gonzalez, RN    Administrations This Visit     iohexol (OMNIPAQUE) 140 MG/ML solution 10 mL     Admin Date  06/02/2022 Action  Given Dose  10 mL Route  Intravenous Administered By  Justyna Gonzalez, RN       "          Again, thank you for allowing me to participate in the care of your patient.        Sincerely,        [facility]

## 2022-06-02 NOTE — PATIENT INSTRUCTIONS
Dear Jose Pimentel    Thank you for choosing [facility] ([facility]) for your iohexol test.  The following information is a summary of our appointment as well as important reminders.      We look forward in seeing you on your next appointment here at [facility] ([facility]).  Please don t hesitate to call us at [phone] to reschedule any of your appointments or to speak with one of the [facility] registered nurses.  It was a pleasure taking care of you today.    Sincerely,    [facility]  [facility]  [address] [place] [address]  [address]  Phone:  [phone]

## 2022-06-02 NOTE — PROGRESS NOTES
"Iohexol Timed Test Nursing Note    Jose Pimentel comes to [facility] today for a Iohexol GFR Timed test.   Orders from  were completed.    Progress Note  Height: 6'3\"  Weight: 108.3kg  Age: 61  Gender: male    The following information was verified with the patient:  *Female patients are not pregnant or could not have become recently pregnant: YES, No, Not applicable  *Is there a history of allergy (skin rash, swelling, ect) to :   a. Iodine (except skin reactions to Betadine): NO   b. Intravenous radio-contrast agents: NO   c. Seafood: NO     RN provided patient with educational handout regarding timed test. YES    Medication administered :   Iohexol (Omnipaque 140 mg Iodine/ ml concentration) 10 mls.  Site administered PIV to left upper arm  Start time 0758   Stop time 0800    Blood draws to be taken from PIV in right upper arm at 2 hr and 4 hr post iohexol in transplant clinic.      Patient tolerated the procedure well    Vitals were reviewed   /83 (BP Location: Right arm, Patient Position: Sitting, Cuff Size: Adult Large)   Pulse 54   Temp 97.4  F (36.3  C) (Oral)   Resp 16   Ht 1.905 m (6' 3\")   Wt 108.3 kg (238 lb 11.2 oz)   SpO2 100%   BMI 29.84 kg/m      Discharge Plan    Discharge instructions reviewed with patient: YES  Discharge papers printed and given to patient: YES  Patient/Representative verbalized understanding, all questions answered: YES    Discharged from unit at 0805 with whom: self to transplant clinic.    Justyna Gonzalez, RACIEL    Administrations This Visit     iohexol (OMNIPAQUE) 140 MG/ML solution 10 mL     Admin Date  06/02/2022 Action  Given Dose  10 mL Route  Intravenous Administered By  Justyna Gonzalez, RN              "

## 2022-06-03 LAB
ATRIAL RATE - MUSE: 50 BPM
DIASTOLIC BLOOD PRESSURE - MUSE: NORMAL MMHG
INTERPRETATION ECG - MUSE: NORMAL
P AXIS - MUSE: 51 DEGREES
PR INTERVAL - MUSE: 216 MS
QRS DURATION - MUSE: 104 MS
QT - MUSE: 464 MS
QTC - MUSE: 423 MS
QUANTIFERON MITOGEN: 10 IU/ML
QUANTIFERON NIL TUBE: 0.03 IU/ML
QUANTIFERON TB1 TUBE: 0.03 IU/ML
QUANTIFERON TB2 TUBE: 0.03
R AXIS - MUSE: 17 DEGREES
SYSTOLIC BLOOD PRESSURE - MUSE: NORMAL MMHG
T AXIS - MUSE: 147 DEGREES
VENTRICULAR RATE- MUSE: 50 BPM
WNV IGG SER IA-ACNC: 0.53 IV
WNV IGM SER IA-ACNC: 0 IV

## 2022-06-04 LAB
GAMMA INTERFERON BACKGROUND BLD IA-ACNC: 0.03 IU/ML
M TB IFN-G BLD-IMP: NEGATIVE
M TB IFN-G CD4+ BCKGRND COR BLD-ACNC: 9.97 IU/ML
MITOGEN IGNF BCKGRD COR BLD-ACNC: 0 IU/ML
MITOGEN IGNF BCKGRD COR BLD-ACNC: 0 IU/ML

## 2022-06-05 LAB
CMV IGG SERPL IA-ACNC: >10 U/ML
CMV IGG SERPL IA-ACNC: ABNORMAL
EBV VCA IGG SER IA-ACNC: >750 U/ML
EBV VCA IGG SER IA-ACNC: POSITIVE

## 2022-06-06 LAB
EBV VCA IGM SER IA-ACNC: <10 U/ML
EBV VCA IGM SER IA-ACNC: NORMAL

## 2022-06-06 NOTE — PROGRESS NOTES
Transplant Surgery Consult Note    Medical record number: [id number]  YOB: 1960,   Consult requested by the patient for evaluation of kidney donation candidacy.    Assessment and Recommendations: Mr. Pimentel appears to be a good candidate for kidney donation at this point in the evaluation. The following issues will need to be addressed prior to formal review:    CT abdomen and pelvis with contrast to be ordered for assessment vascular anatomy: Yes  Dietician consult ordered: Yes  Social work consult ordered: Yes  CXR and EKG ordered:  Yes  Transplant donor labs ordered to include HLA, ABOx2, Cr, Iohexol GFR or Cr clearance, virology etc.       Patient would like to donate to a friend. The majority of our visit today was spent in counseling regarding the medical and surgical risks of kidney donation; the typical jhonny-and post-operative experience and recovery/return to work pattern; restrictions related to the surgery (driving; lifting; exercise).      We also talked about post-op visits and longer term health care maintenance, as well as the implications of having one remaining kidney. This discussion included, but was not limited to rates of complications such as bleeding, infection, need for transfusion, reoperation, other organ injury, future bowel obstruction, incisional hernia, port site pain, varicocele, venous thrombosis, pulmonary embolism, renal failure, and death (3 per 10,000).     We discussed long-term risks in detail.  I discussed the articles suggesting a small increase in ESKD in donors and their limitations    We discussed recovery, including length of hospital stay; no new meds other than pain meds on discharge; limitations after surgery (no driving for a couple of weeks; no lifting over 10 lbs or exercise stretching abdominal muscles for 6 weeks;; and fatigue for the first few weeks postdonation.  I informed him of our donor survey results on donors feeling ready to drive, and on  return to feeling back to normal.  We also discussed the need for maintaining a healthy lifestyle long-term after donation    We discussed the national paired system and the possibility of participating, if not a match for the intended recipient.  I explained how the system worked.    We discussed the increased risk for venous thrombosis for the 1st two postoperative risks.  We discussed that if flying home in the 1st 2 weeks, he should get up and walk around (after seat belt sign is off) every 30 minutes.  We also discussed needing help with his luggage. At the conclusion of the visit, all questions had been answered and Mr. Pimentel's candidacy for donation will be reviewed at our Multidisciplinary Donor Selection Committee. He will stay in contact with the nurse coordinator with any concerns.      45 min spent on the date of the encounter in chart review, patient visit,  documentation and/or discussion with other providers about the issues documented above.        Lei Hanson MD  Surgical Director, Kidney Transplantation                                                                                                      ---------------------------------------------------------------------------------------------------    HPI: Jose Pimentel is a 61 year old year old male who presents for a kidney donor evaluation.          Past Medical History:   Diagnosis Date     Borderline blood pressure      Colitis      Crohn's disease (H)      Infection due to 2019 novel coronavirus 01/2022     Nonspecific abnormal electrocardiogram (ECG) (EKG)     per pt- irregular rate     Past Surgical History:   Procedure Laterality Date     bicep reattachment       KNEE SURGERY       ZZC FIXATION OF ABDOMINAL RIB       No family history on file.  Social History     Socioeconomic History     Marital status:      Spouse name: Not on file     Number of children: Not on file     Years of education: Not on file     Highest education  level: Not on file   Occupational History     Not on file   Tobacco Use     Smoking status: Never Smoker     Smokeless tobacco: Former User   Substance and Sexual Activity     Alcohol use: Yes     Drug use: Never     Sexual activity: Not on file   Other Topics Concern     Not on file   Social History Narrative     Not on file     Social Determinants of Health     Financial Resource Strain: Not on file   Food Insecurity: Not on file   Transportation Needs: Not on file   Physical Activity: Not on file   Stress: Not on file   Social Connections: Not on file   Intimate Partner Violence: Not on file   Housing Stability: Not on file       ROS:   CONSTITUTIONAL:  No fevers or chills  EYES: negative for icterus  ENT:  negative for hearing loss, tinnitus and sore throat  RESPIRATORY:  negative for cough, sputum, dyspnea  CARDIOVASCULAR:  negative for chest pain  GASTROINTESTINAL:  negative for nausea, vomiting, diarrhea or constipation  GENITOURINARY:  negative for incontinence, dysuria, bladder emptying problems  HEME:  No easy bruising  INTEGUMENT:  negative for rash and pruritus  NEURO:  Negative for headache, seizure disorder    Allergies:   Allergies   Allergen Reactions     Sulfa Drugs Hives       Medications:  Prescription Medications as of 6/6/2022       Rx Number Disp Refills Start End Last Dispensed Date Next Fill Date Owning Pharmacy    calcium citrate (CITRACAL) 950 (200 Ca) MG tablet            Sig: Take 1 tablet by mouth daily    Class: Historical    Route: Oral    Cholecalciferol 10 MCG (400 UNIT) CAPS            Class: Historical    Route: Oral    Flaxseed Oil (LINSEED OIL) OIL            Class: Historical    Route: Oral    hydrochlorothiazide (HYDRODIURIL) 25 MG tablet    10/4/2021        Sig: Take 25 mg by mouth    Class: Historical    Route: Oral    mesalamine (LIALDA) 1.2 g DR tablet    7/20/2012        Sig: Take 1,200 mg by mouth    Class: Historical    Route: Oral    Multiple Vitamin (MULTI-VITAMINS)  TABS            Sig: Take 1 tablet by mouth daily    Class: Historical    Route: Oral    omega 3 1200 MG CAPS            Class: Historical    Route: Oral          Exam:      Body mass index is 29.79 kg/m .  Constitutional - A&O in NAD.   Eyes - no redness or discharge.  Sclera anicteric  Respiratory - no cough, no labored breathing  Musculoskeletal - range of motion normal  Skin - no discoloration, no jaundice  Neurological - no tremors.  No facial droop or dysarthria  Psychiatric - normal mood and affect  The rest of a comprehensive physical examination is deferred due to PHE (public health emergency) video visit restrictions     Diagnostics:   Recent Results (from the past 336 hour(s))   EBV Capsid Antibody IgG    Collection Time: 06/02/22  6:46 AM   Result Value Ref Range    EBV Capsid Melisa IgG Instrument Value >750.0 (H) <18.0 U/mL    EBV Capsid Antibody IgG Positive (A) No detectable antibody.   CMV Antibody IgG    Collection Time: 06/02/22  6:46 AM   Result Value Ref Range    CMV Melisa IgG Instrument Value >10.00 (H) <0.60 U/mL    CMV Antibody IgG Positive, suggests recent or past exposure. (A) No detectable antibody.    Prostate spec antigen screen  for men over 50    Collection Time: 06/02/22  6:46 AM   Result Value Ref Range    Prostate Specific Antigen Screen 0.35 0.00 - 4.00 ug/L   West Nile Virus Antibody IgG IgM    Collection Time: 06/02/22  6:46 AM   Result Value Ref Range    West Nile IgG Serum 0.53 <=1.29 IV    West Nile IgM Serum 0.00 <=0.89 IV   Treponema Abs w Reflex to RPR and Titer    Collection Time: 06/02/22  6:46 AM   Result Value Ref Range    Treponema Antibody Total Nonreactive Nonreactive   HIV Antigen Antibody Combo Pretransplant    Collection Time: 06/02/22  6:46 AM   Result Value Ref Range    HIV Antigen Antibody Combo Pretransplant Nonreactive Nonreactive   Hepatitis C antibody    Collection Time: 06/02/22  6:46 AM   Result Value Ref Range    Hepatitis C Antibody Reactive (A) Nonreactive    Hepatitis B surface antigen    Collection Time: 06/02/22  6:46 AM   Result Value Ref Range    Hepatitis B Surface Antigen Nonreactive Nonreactive   Hepatitis B Surface Antibody    Collection Time: 06/02/22  6:46 AM   Result Value Ref Range    Hepatitis B Surface Antibody 0.00 <8.00 m[IU]/mL   Hepatitis B core antibody    Collection Time: 06/02/22  6:46 AM   Result Value Ref Range    Hepatitis B Core Antibody Total Nonreactive Nonreactive   Protein  random urine    Collection Time: 06/02/22  6:46 AM   Result Value Ref Range    Total Protein Random Urine g/L 0.07 g/L    Total Protein Urine g/gr Creatinine 0.08 0.00 - 0.20 g/g Cr    Creatinine Urine mg/dL 85 mg/dL   Albumin Random Urine Quantitative with Creat Ratio    Collection Time: 06/02/22  6:46 AM   Result Value Ref Range    Creatinine Urine mg/dL 85 mg/dL    Albumin Urine mg/L 5 mg/L    Albumin Urine mg/g Cr 5.88 0.00 - 17.00 mg/g Cr   Routine UA with microscopic    Collection Time: 06/02/22  6:46 AM   Result Value Ref Range    Color Urine Yellow Colorless, Straw, Light Yellow, Yellow    Appearance Urine Clear Clear    Glucose Urine Negative Negative mg/dL    Bilirubin Urine Negative Negative    Ketones Urine Negative Negative mg/dL    Specific Gravity Urine 1.012 1.003 - 1.035    Blood Urine Negative Negative    pH Urine 6.0 5.0 - 7.0    Protein Albumin Urine Negative Negative mg/dL    Urobilinogen Urine Normal Normal, 2.0 mg/dL    Nitrite Urine Negative Negative    Leukocyte Esterase Urine Negative Negative    RBC Urine 1 <=2 /HPF    WBC Urine <1 <=5 /HPF   CBC with platelets    Collection Time: 06/02/22  6:46 AM   Result Value Ref Range    WBC Count 5.8 4.0 - 11.0 10e3/uL    RBC Count 5.30 4.40 - 5.90 10e6/uL    Hemoglobin 16.3 13.3 - 17.7 g/dL    Hematocrit 47.1 40.0 - 53.0 %    MCV 89 78 - 100 fL    MCH 30.8 26.5 - 33.0 pg    MCHC 34.6 31.5 - 36.5 g/dL    RDW 12.5 10.0 - 15.0 %    Platelet Count 186 150 - 450 10e3/uL   Hemoglobin A1c    Collection Time:  06/02/22  6:46 AM   Result Value Ref Range    Hemoglobin A1C 5.6 0.0 - 5.6 %   Phosphorus    Collection Time: 06/02/22  6:46 AM   Result Value Ref Range    Phosphorus 2.7 2.5 - 4.5 mg/dL   Uric acid    Collection Time: 06/02/22  6:46 AM   Result Value Ref Range    Uric Acid 4.7 3.5 - 7.2 mg/dL   Lipid Profile    Collection Time: 06/02/22  6:46 AM   Result Value Ref Range    Cholesterol 202 (H) <200 mg/dL    Triglycerides 85 <150 mg/dL    Direct Measure HDL 68 >=40 mg/dL    LDL Cholesterol Calculated 117 (H) <=100 mg/dL    Non HDL Cholesterol 134 (H) <130 mg/dL    Patient Fasting > 8hrs? Yes    Comprehensive metabolic panel    Collection Time: 06/02/22  6:46 AM   Result Value Ref Range    Sodium 139 133 - 144 mmol/L    Potassium 3.5 3.4 - 5.3 mmol/L    Chloride 104 94 - 109 mmol/L    Carbon Dioxide (CO2) 27 20 - 32 mmol/L    Anion Gap 8 3 - 14 mmol/L    Urea Nitrogen 15 7 - 30 mg/dL    Creatinine 0.94 0.66 - 1.25 mg/dL    Calcium 9.0 8.5 - 10.1 mg/dL    Glucose 114 (H) 70 - 99 mg/dL    Alkaline Phosphatase 67 40 - 150 U/L    AST 29 0 - 45 U/L    ALT 43 0 - 70 U/L    Protein Total 7.2 6.8 - 8.8 g/dL    Albumin 3.9 3.4 - 5.0 g/dL    Bilirubin Total 0.8 0.2 - 1.3 mg/dL    GFR Estimate >90 >60 mL/min/1.73m2   Quantiferon TB Gold Plus Grey Tube    Collection Time: 06/02/22  6:46 AM    Specimen: Arm, Right; Blood   Result Value Ref Range    Quantiferon Nil Tube 0.03 IU/mL   Quantiferon TB Gold Plus Green Tube    Collection Time: 06/02/22  6:46 AM    Specimen: Arm, Right; Blood   Result Value Ref Range    Quantiferon TB1 Tube 0.03 IU/mL   Quantiferon TB Gold Plus Yellow Tube    Collection Time: 06/02/22  6:46 AM    Specimen: Arm, Right; Blood   Result Value Ref Range    Quantiferon TB2 Tube 0.03    Quantiferon TB Gold Plus Purple Tube    Collection Time: 06/02/22  6:46 AM    Specimen: Arm, Right; Blood   Result Value Ref Range    Quantiferon Mitogen 10.00 IU/mL   Adult Type and Screen    Collection Time: 06/02/22  6:46 AM    Result Value Ref Range    ABO/RH(D) O POS     Antibody Screen      SPECIMEN EXPIRATION DATE [id number]    Quantiferon TB Gold Plus    Collection Time: 06/02/22  6:46 AM    Specimen: Arm, Right; Blood   Result Value Ref Range    Quantiferon-TB Gold Plus Negative Negative    TB1 Ag minus Nil Value 0.00 IU/mL    TB2 Ag minus Nil Value 0.00 IU/mL    Mitogen minus Nil Result 9.97 IU/mL    Nil Result 0.03 IU/mL   ABO and Rh 2nd type and screen required    Collection Time: 06/02/22  7:10 AM   Result Value Ref Range    ABO/RH(D) O POS     SPECIMEN EXPIRATION DATE [id number]    INR    Collection Time: 06/02/22  7:10 AM   Result Value Ref Range    INR 0.90 0.85 - 1.15   EKG 12-lead complete w/read - Clinics    Collection Time: 06/02/22 11:22 AM   Result Value Ref Range    Systolic Blood Pressure  mmHg    Diastolic Blood Pressure  mmHg    Ventricular Rate 50 BPM    Atrial Rate 50 BPM    CO Interval 216 ms    QRS Duration 104 ms     ms    QTc 423 ms    P Axis 51 degrees    R AXIS 17 degrees    T Axis 147 degrees    Interpretation ECG       Sinus bradycardia with 1st degree A-V block  Possible Left atrial enlargement  ST & T wave abnormality, consider anterolateral ischemia  Abnormal ECG  No previous ECGs available  Confirmed by MD INA, EDUARDO ([id number]) on 6/3/2022 6:46:14 AM

## 2022-06-07 ENCOUNTER — DOCUMENTATION ONLY (OUTPATIENT)
Dept: TRANSPLANT | Facility: CLINIC | Age: 62
End: 2022-06-07

## 2022-06-07 LAB
A*: NORMAL
A*LOCUS SEROLOGIC EQUIVALENT: 33
A*LOCUS: NORMAL
A*SEROLOGIC EQUIVALENT: 68
ABTEST METHOD: NORMAL
B*: NORMAL
B*LOCUS SEROLOGIC EQUIVALENT: 65
B*LOCUS: NORMAL
B*SEROLOGIC EQUIVALENT: 18
BSA: 2.42 M2
BW-1: NORMAL
C*: NORMAL
C*LOCUS SEROLOGIC EQUIVALENT: 7
C*LOCUS: NORMAL
C*SEROLOGIC EQUIVALENT: 8
DPA1*: NORMAL
DPB1*: NORMAL
DQA1*: NORMAL
DQA1*LOCUS: NORMAL
DQB1*: NORMAL
DQB1*LOCUS SEROLOGIC EQUIVALENT: 2
DQB1*LOCUS: NORMAL
DQB1*SEROLOGIC EQUIVALENT: 5
DRB1*: NORMAL
DRB1*LOCUS SEROLOGIC EQUIVALENT: 17
DRB1*LOCUS: NORMAL
DRB1*SEROLOGIC EQUIVALENT: 16
DRB3*LOCUS SEROLOGIC EQUIVALENT: 52
DRB3*LOCUS: NORMAL
DRB5*: NORMAL
DRB5*SEROLOGIC EQUIVALENT: 51
DRSSO TEST METHOD: NORMAL
IOHEXOL CL UR+SERPL-VRATE: 126 ML/MIN
IOHEXOL CL UR+SERPL-VRATE: 2.78 MG/DL
IOHEXOL CL UR+SERPL-VRATE: 5.54 MG/DL
IOHEXOL CL UR+SERPL-VRATE: 90 /1.73 M2

## 2022-06-07 NOTE — PROGRESS NOTES
Image Review Meeting    ATTENDEES: Dr. Florian, Elodia Briones, Candice Park, Dina Carrasquillo, Brenna Miller    IMAGES REVIEWED: CTA renal from 6/2/22    Impression:      1.   1a. The right kidney contains two arteries, two veins, and single  ureter.  1b. The right kidney parenchyma demonstrates 2 small hypodensities  which are likely cysts. The renal volume is 247 cc.     2.   2a. The left kidney contains two arteries, single vein, and single  ureter.  2b. The left kidney parenchyma is normal. The renal volume is 229 cc.     3. Fairly large left inguinal hernia containing a loop of unobstructed sigmoid colon    DECISION: LEFT    INCIDENTALS: Yes:   Fairly large left inguinal hernia containing a loop of unobstructed sigmoid colon

## 2022-06-08 ENCOUNTER — COMMITTEE REVIEW (OUTPATIENT)
Dept: TRANSPLANT | Facility: CLINIC | Age: 62
End: 2022-06-08

## 2022-06-08 DIAGNOSIS — Z00.5 TRANSPLANT DONOR EVALUATION: Primary | ICD-10-CM

## 2022-06-08 PROBLEM — B18.2 HEPATITIS C, CHRONIC (H): Status: ACTIVE | Noted: 2022-06-08

## 2022-06-08 NOTE — LETTER
PHYSICIAN ORDERS      DATE & TIME ISSUED: 2022 10:04 AM  PATIENT NAME: Jose Pimentel   : 1960     [facility] MR# [if applicable]: [id number]     DIAGNOSIS:  kidney donor evaluation   ICD-10 CODE: Z00.5      Please draw the following labs:     -HCV RNA quant  -Parathyroid Hormone   -Vitamin D deficiency screening (25-OH vitamin D3 and 25-OH vitamin D2)  -PTT    Any questions please call: Dayana Ga at   Fax results ASAP to:     [phone].      Titus Chowdary MD   of Medicine  Division of Nephrology and Hypertension   Transplant Nephrology

## 2022-06-08 NOTE — COMMITTEE REVIEW
Living Donor Committee Review Note Evaluation Date: 6/2/2022  Committee Review Date: 6/8/2022    Donor being evaluated for: Kidney    Transplant Phase: Evaluation  Transplant Status: Active    Transplant Coordinator: Dayana Ga  Transplant Surgeon:       Committee Review Members:  Immunology Rhys Valencia, PhD   Nephrology Baltazar Mercedes MD, Regis Ramirez MD   Nutrition Jolie Harris,    Pharmacist Jeffery Pereyra, [facility], Suzette Villa, [facility]    - Clinical Rose Swain, [facility], Denise Lomeli, [facility]   Transplant Katelin Lau, RN, Valentin Cardenas, RN, Trinidad Amezquita, N, Vanessa Mahoney, RN, Dayana Ga, RACIEL, Candice Neal, RN   Transplant Surgery Omid Funk MD       Transplant Eligibility: Acceptable Mental Health    Committee Review Decision: Needs Re-presentation    Relative Contraindications:     Absolute Contraindications: None    Committee Chair Omid Funk MD verbally attested to the committee's decision.    Committee Discussion Details:       Reviewed Cipriano's abnormal evaluation findings:    -BMI 30- noted, weight loss advised, not required to proceed as a donor    -fasting glucose- A1C okay, acceptable, no further evaluation needed    -EKG and reported hx abnormal EKG- stress echo and obtain outside cardiology records     -HCV suresh- needs HCV RNA    Also of note:   - CT reviewed at a prior CT review meeting: see 6/7/22 note, LEFT for donation.     -needs PTT  -needs eye exam  -recommend checking PTH, vit D and calcitriol as follow up to litholink result    Next Steps:    Call patient to discuss results/reccomendations     If patient interested in continuing in donor eval, send appropriate orders/billing info    Jose updated of above. He confirmed his desire to proceed. Per his request lab and echo orders with billing instructions emailed to him. Jose confirmed he will have his cardiology consult faxed here for review.

## 2022-06-08 NOTE — LETTER
PHYSICIAN ORDERS      DATE & TIME ISSUED: 2022 10:08 AM  PATIENT NAME: Jose Pimentel   : 1960     [facility] MR# [if applicable]: [id number]     DIAGNOSIS:  kidney donor evaluation   ICD-10 CODE:  Z00.5     Exercise stress test echocardiogram     Any questions please call: Dayana Ga at [phone]  Fax results asap to:    [phone].      Titus Chowdary MD   of Medicine  Division of Nephrology and Hypertension   Transplant Nephrology

## 2022-06-08 NOTE — LETTER
PHYSICIAN ORDERS      DATE & TIME ISSUED: 2022 3:35 PM  PATIENT NAME: Jose Pimentel   : 1960     [facility] MR# [if applicable]: [id number]     DIAGNOSIS:  kidney donor evaluation   ICD-10 CODE: Z005.      Please draw the following labs:     -HCV RNA quant  -Parathyroid Hormone   -1,25 dihydroxy vitamin D  -Vitamin D deficiency screening (25-OH vitamin D3 and 25-OH vitamin D2)  -PTT     Any questions please call: Dayana Ga at   Fax results ASAP to:     [phone].      Titus Chowdary MD   of Medicine  Division of Nephrology and Hypertension   Transplant Nephrology

## 2022-06-15 ENCOUNTER — TRANSFERRED RECORDS (OUTPATIENT)
Dept: HEALTH INFORMATION MANAGEMENT | Facility: CLINIC | Age: 62
End: 2022-06-15

## 2022-06-15 LAB — EJECTION FRACTION: 55 %

## 2022-06-16 ENCOUNTER — TRANSFERRED RECORDS (OUTPATIENT)
Dept: HEALTH INFORMATION MANAGEMENT | Facility: CLINIC | Age: 62
End: 2022-06-16

## 2022-06-16 LAB — EJECTION FRACTION: 60 %

## 2022-07-05 ENCOUNTER — TRANSFERRED RECORDS (OUTPATIENT)
Dept: HEALTH INFORMATION MANAGEMENT | Facility: CLINIC | Age: 62
End: 2022-07-05

## 2022-07-13 ENCOUNTER — COMMITTEE REVIEW (OUTPATIENT)
Dept: TRANSPLANT | Facility: CLINIC | Age: 62
End: 2022-07-13

## 2022-07-13 NOTE — COMMITTEE REVIEW
Living Donor Committee Review Note Evaluation Date: 6/2/2022  Committee Review Date: 7/13/2022    Donor being evaluated for: Kidney    Transplant Phase: Evaluation  Transplant Status: Active    Transplant Coordinator: Dayana Ga  Transplant Surgeon:       Committee Review Members:  Independent Living Donor Advocate Rsoe Swain, [facility], Denise Lomeli, [facility]   Nephrology Baltazar Mercedes MD, Regis Ramirez MD   Nutrition Jolie Harris, [employer]   Pharmacist Suzette Villa, [facility]    - Clinical Jessica Dominguez   Transplant Nissa Mary Thompson, RN, Katelin Singh, RN, Trinidad Amezquita LPN, Vanessa Mahoney, RN, Dayana Ga, RN   Transplant Surgery Omid Funk MD, Tracee Gavin MD, MD       Transplant Eligibility: Acceptable Mental Health    Committee Review Decision: Needs Re-presentation    Relative Contraindications: None    Absolute Contraindications: None    Committee Chair Omid Funk MD verbally attested to the committee's decision.    Committee Discussion Details:     -Prior cardiology records including consult with stress echo from 7/5/22 reviewed- acceptable, no further cardiac testing needed    -HCV RNA reviewed- acceptable, no further testing advised    -PTT done- WNL    PTH and vitamin D testing reviewed- recommend checking serum oxalate    -need to confirm eye exam    Jose gerber of above. Order for oxalate level sent to him. He confirmed he has had an eye exam and will have it send here.

## 2022-07-13 NOTE — LETTER
PHYSICIAN ORDERS      DATE & TIME ISSUED: 2022 10:58 AM  PATIENT NAME: oJse Pimentel   : 1960     [facility] MR# [if applicable]: [id number]     DIAGNOSIS:  kidney donor evaluation   ICD-10 CODE: z00.5     -Serum Oxalate     Please find billing instructions below. Any questions please call: Dayana Ga at [phone]    Fax results ASAP to:   [phone].      Baltazar Mercedes MD                              REIMBURSEMENT INFORMATION FOR LIVING ORGAN DONORS     LIVING ORGAN DONOR: This form MUST accompany & remain attached to Orders &  given to Provider and/or Healthcare Facility Business Office     PROVIDER/FACILITY INSTRUCTIONS: By accepting to perform these services for living organ  donation, the provider/facility agrees to exclusively bill the [facility] instead of billing  the patient or any insurance provider and agrees to accept the reimbursement, as described below, as  payment in full for services rendered.     PROVIDER BILLING INSTRUCTIONS:  1. [facility] agrees to pay for all authorized testing ordered by our transplant  program that is related to living organ donation. The attached orders/tests are part of the donor  Evaluation.     2. Do not bill the donor or donor's insurance. Send an itemized invoice, claim or statement to:     [facility]  Transplant Finance/Donor Billing  [address] [place] [address], [place] [address]     3. Billing statements must include the patient first and last name, date of birth, the CPT procedure code  and date of service. Please bill service on the ORIGINAL UBO4 or 1500 with appropriate CPT/HCPCS  codes along with W-9 and send to the above address to insure timely reimbursement.     4. Claims should be submitted no later than six months from the date when services are rendered.  Claims denied for late submission should not be billed to the donor or their private insurance carrier.     5.  [facility] will reimburse all charges at 100% of the Medicare Fee Schedule as  defined in the Code of Federal Regulations (CFR) 42, Chapter IV. This is to be considered payment  in full. [facility], the patient, and/or the patient's insurance are NOT to  be billed any balance, co-payment, or deductible, per Medicare regulations. **ATTN: Facility  providing services for attached/enclosed Living Donor Orders; If facility does NOT AGREE to  the reimbursement rate stated above, PLEASE DENY SERVICES & refer Donor/patient back to  their [facility] Coordinator Transplant Center.     6. Patients are NOT to make any payments at the time of service.     Please forward this information to your billing department so that a donor account can be set up with  these instructions.     Should you have any questions, please contact the Donor Billing office at [phone],  Monday - Friday, 8:00 a.m. to 4:00 p.m.   Thank you for your assistance.

## 2022-07-28 ENCOUNTER — TRANSFERRED RECORDS (OUTPATIENT)
Dept: HEALTH INFORMATION MANAGEMENT | Facility: CLINIC | Age: 62
End: 2022-07-28

## 2022-08-03 ENCOUNTER — COMMITTEE REVIEW (OUTPATIENT)
Dept: TRANSPLANT | Facility: CLINIC | Age: 62
End: 2022-08-03

## 2022-08-03 ENCOUNTER — DOCUMENTATION ONLY (OUTPATIENT)
Dept: TRANSPLANT | Facility: CLINIC | Age: 62
End: 2022-08-03

## 2022-08-03 NOTE — LETTER
Mr. Jose Pimentel   [address] [address]   August 3, 2022     Dear Jose   I am writing on behalf of the Transplant Program at the [facility]. I would like to take this opportunity to thank you for recently undergoing an evaluation as a possible kidney donor.   Your evaluation is complete and your test results have been reviewed and discussed by the donor team at our weekly meeting. I am pleased to tell you that the team has determined that you meet our criteria for living kidney donation and you have been approved to donate. I would be happy to review any specific details of your evaluation testing with you and to discuss the next steps in this process. Thank you.   Sincerely,   Dayana Ga RN  Living Donor Coordinator           Living Donor Program  [facility], [facility]  Tele: [phone] or [phone], ext. [phone]

## 2022-08-03 NOTE — PHARMACY-MEDICATION REGIMEN REVIEW
Pharmacy Living Kidney Donor Pre-Surgery Medication Evaluation     This patient is a 61 year old male being considered for living kidney donation.  As part of the donor pre-surgery patient evaluation, pharmacy has screened this patient's electronic medical record for medication related concerns.      Assessment / Plan    The following medication related issues may be of possible concern for this patient post surgery, based on the medical record medication list review.     Medical management of mesalamine in perioperative period is not well described. Chang et al recommend that aminosalicylates (sulfasalazine and mesalamine) be discontinued 1 day before surgery with resumption 3 days after surgery, especially in patients with susceptibility for decreased glomerular filtration.    Chang POWER, Citlalli MALIK, Romie A, Michael F, Avila A, Ricardo STEWART. Inflammatory bowel disease: perioperative pharmacological considerations. [facility]repo Clin Proc. 2011 Aug;86(8):748-57. doi: 10.4065/mcp.2011.0074. PMID: 99865881; PMCID: [id number].        Pharmacy will continue to participate in this patient's care throughout the surgery course. Please contact pharmacy with any further medication related questions or concerns.         Jeffery Pereyra, Pharm.D.  [facility]  [phone]

## 2022-08-03 NOTE — COMMITTEE REVIEW
Living Donor Committee Review Note Evaluation Date: 6/2/2022  Committee Review Date: 8/3/2022    Donor being evaluated for: Kidney    Transplant Phase: Evaluation  Transplant Status: Approved    Transplant Coordinator: Dayana Ga  Transplant Surgeon:       Committee Review Members:  Independent Living Donor Advocate Denise Lomeli, [facility]   Nephrology Titus Chowdary MD, Julio Nolan MD   Nutrition Jolie Harris, [employer]   Pharmacist Jeffery Pereyra, [facility]    - Clinical Alida Sykes, [facility]   Transplant Nissa Mary Thompson, RACIEL, Britta Mack, RN, Olga Ferris, NP, Trinidad Amezquita, LPN, Vanessa Mahoney, RACIEL, Dayana Ga, RACIEL, Candice Neal RN   Transplant Surgery Omid Funk MD       Transplant Eligibility: Acceptable Mental Health, Acceptable Physical Health    Committee Review Decision: Approved    Relative Contraindications: None    Absolute Contraindications: None    Committee Chair Omid Funk MD verbally attested to the committee's decision.    Committee Discussion Details:     Serum oxalate level done 7/28/22 reviewed and discussed- acceptable, no further evaluation needed, no follow up advised.     Donor approved.     Hernia noted on renal CTA reviewed- surgeon recommending this be repaired either prior to or after donation surgery, not during donation as it would be a second site surgery. If Jose wishes to proceed with hernia prior to donation he would need to wait 6 weeks prior to donation surgery. Also okay to proceed with donation prior to hernia repair.     Jose updated of above. He confirmed desire to proceed. Plan pending compatibility assessment.

## 2022-08-10 ENCOUNTER — DOCUMENTATION ONLY (OUTPATIENT)
Dept: TRANSPLANT | Facility: CLINIC | Age: 62
End: 2022-08-10

## 2022-08-10 NOTE — PROGRESS NOTES
IKTP Review    Compatibility testing including virtual cross match and eplet anslysis completed by Dr. Valencia reviewed by interdisciplinary team including Dr. Valencia and Dr. Lane. 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     Team advised:   Recip needs additional evaluation to determine K or KP (recip team to schedule ASAP). Recip HLA common so would not likely to be difficult to match in paired exchange.   Rediscuss pending additional information from recipient team.

## 2022-08-24 ENCOUNTER — TELEPHONE (OUTPATIENT)
Dept: TRANSPLANT | Facility: CLINIC | Age: 62
End: 2022-08-24

## 2022-08-24 NOTE — TELEPHONE ENCOUNTER
Still awaiting update from recipient team regarding plan of care.     Outreach call made to Jose to see if she has/is willing to share his approval with the recipient.     Jose confirmed he has shared this his recipient. Recip team updated.

## 2022-08-30 ENCOUNTER — DOCUMENTATION ONLY (OUTPATIENT)
Dept: TRANSPLANT | Facility: CLINIC | Age: 62
End: 2022-08-30

## 2022-08-30 NOTE — PROGRESS NOTES
Per recipient team recommendation is for donor to do advanced donation in paired exchange if open to advanced. If he prefers to wait, recipient would likely be ready in early October.    Plan to review at [facility] 8/31/22. Jose updated, he will consider advanced donation and let me know.

## 2022-08-31 ENCOUNTER — TELEPHONE (OUTPATIENT)
Dept: TRANSPLANT | Facility: CLINIC | Age: 62
End: 2022-08-31

## 2022-08-31 ENCOUNTER — DOCUMENTATION ONLY (OUTPATIENT)
Dept: TRANSPLANT | Facility: CLINIC | Age: 62
End: 2022-08-31

## 2022-08-31 NOTE — TELEPHONE ENCOUNTER
Sent Jose the following email:    Partha Garcia!     Because you will be entering our paired exchange program, I will be taking over for Dayana as your living donor coordinator.  I wondered if you would have time to connect next week to review education/consent for kidney paired exchange and review your options and next steps.  I will send you the consents to review ahead of our meeting via [employer], but feel free to wait until we review them to sign them.     Let me know if any of these times (Central Standard Time) work for you:     9/6 1230pm or 1:30pm  9/7 11am, 11:30am, 2pm,   9/8 Between 9am-11am  9/9 Between 9am-11am or 12pm     If next week doesn t work out, please throw out some times the following week that would work.     FYI: There is an additional blood draw required to be entered in the National Kidney Registry.  The kit will come to your home in the next day or two.  Just hang on to it until we connect.  Is there a local lab where you could have this kit drawn next week (or the week after)?  I will send you orders/billing info and the kit comes with instructions and return shipping envelopes.  You would just need to make the lab appointment and let them know you were bringing a kit for send out.       I look forward to connecting soon.  Have a nice holiday weekend!  Rabia Thompson RN, BSN, CCTN  Living Donor Coordinator  [facility]  Solid Organ Transplant Care  [address] [place] [id number]  [email or web address]   Office: [phone]  Pager: [phone]  Employed by [facility]

## 2022-08-31 NOTE — PROGRESS NOTES
IKTP Review    Compatibility testing including 3g, internal, donor/recip characteristics including recip situation with K and KP wait lists reviewed by interdisciplinary team including Dr. Valencia and Dr. Florian. Team recommended recipient be listed for both living donor kidney in NKR paired exchange and  KP to increase his odds of getting the best outcome. Donor could consider advanced but would need to know that a good KP offer may come prior to living donor kidney.     Jose updated of above. He verbalized understanding and confirmed his desire to proceed. He is aware he will be contacted by a coordinator for paired exchange next week.

## 2022-09-07 ENCOUNTER — TELEPHONE (OUTPATIENT)
Dept: TRANSPLANT | Facility: CLINIC | Age: 62
End: 2022-09-07

## 2022-09-07 NOTE — LETTER
REIMBURSEMENT INFORMATION FOR LIVING ORGAN DONORS    LIVING ORGAN DONOR: This form MUST accompany & remain attached to Orders &  given to Provider and/or Healthcare Facility Business Office    PROVIDER/FACILITY INSTRUCTIONS: By accepting to perform these services for living organ  donation, the provider/facility agrees to exclusively bill the [facility] instead of billing  the patient or any insurance provider and agrees to accept the reimbursement, as described below, as  payment in full for services rendered.    PROVIDER BILLING INSTRUCTIONS:  1. [facility] agrees to pay for all authorized testing ordered by our transplant  program that is related to living organ donation. The attached orders/tests are part of the donor  Evaluation.    2. Do not bill the donor or donor's insurance. Send an itemized invoice, claim or statement to:    [facility]  Transplant Finance/Donor Billing  [address] [place] [address], [place] [address]    3. Billing statements must include the patient first and last name, date of birth, the CPT procedure code  and date of service. Please bill service on the ORIGINAL UBO4 or 1500 with appropriate CPT/HCPCS  codes along with W-9 and send to the above address to insure timely reimbursement.    4. Claims should be submitted no later than six months from the date when services are rendered.  Claims denied for late submission should not be billed to the donor or their private insurance carrier.    5. [facility] will reimburse all charges at 100% of the Medicare Fee Schedule as  defined in the Code of Federal Regulations (CFR) 42, Chapter IV. This is to be considered payment  in full. [facility], the patient, and/or the patient's insurance are NOT to  be billed any balance, co-payment, or deductible, per Medicare regulations. **ATTN: Facility  providing services for attached/enclosed  Living Donor Orders; If facility does NOT AGREE to  the reimbursement rate stated above, PLEASE DENY SERVICES & refer Donor/patient back to  their [facility] Coordinator Transplant Center.    6. Patients are NOT to make any payments at the time of service.    Please forward this information to your billing department so that a donor account can be set up with  these instructions.    Should you have any questions, please contact the Donor Billing office at [phone],  Monday - Friday, 8:00 a.m. to 4:00 p.m.   Thank you for your assistance.

## 2022-09-07 NOTE — LETTER
"PHYSICIAN ORDERS: NKR CRYO KIT      DATE & TIME ISSUED: 2022 11:38 AM  PATIENT NAME: Jose Pimentel   : 1960     [facility] MR# [if applicable]: [id number]     DIAGNOSIS:  Potential Organ Donor  ICD-10 CODE: z00.5     1.  Please draw 7 yellow top (acid-citrate-dextrose) 10cc tubes & 1 purple top tube    Pre-Label the tubes with the date/time of the specimen collection, the donor alias \"[id number]\", and the donor's  using the supplied labels    Blood should be sent SAME DAY via FedEx overnight. May be collected M-F.       2.  Mail blood to using enclosed pre-paid return FedEx envelope.    3.  Send Billing to [facility] (see included billing sheet)    Any questions please call: Rabia Thompson [phone] or [phone] and ask to talk to someone on the Living Donor Team.        Tracee Gavin MD FACS  Assistant Professor of Surgery  Director, Living Kidney Donor Program.      "

## 2022-09-20 ENCOUNTER — TELEPHONE (OUTPATIENT)
Dept: TRANSPLANT | Facility: CLINIC | Age: 62
End: 2022-09-20

## 2022-09-23 NOTE — TELEPHONE ENCOUNTER
"Called Jose to introduce myself and review options for kidney donation.  First we discussed compatibility.  I reinforced what Jose's previous education re: his matching strength with his intended recipient.  Jose and his intended recipient (friend Alexandro) are compatible by blood type and compatible by tissue typing.  However, they are a medium eplet mm and recipient Alexandro would likely do better in kidney paired exchange, especially because they are a blood-type advantageous pair (donor: O, recipient: A).  We reviewed they can choose to do a trial period in NKR and re-assess as time passes if they want the flexibility to leverage the NKR pool while keeping direct donation on the table.  Jose is agreeable to our KPD program and would like to re-group after his intended recipient, Alexandro, completes some appts he has in the coming weeks.  Then we will devise a plan based on updated information.     Next we reviewed \"KPD Donor Informed Consent\" Revision date 9/5/2018 in its entirety.       KPD Consent and NKR AIDEN was sent via [employer].  I gave instructions on how to return them to me for practitioner signature.  We also discussed the NKR cryo kit.  Jose will have this drawn at a local lab in [place].     Jose knows how to get a hold of me in the meantime if he has any additional questions/concerns.       Rabia Thompson RN, BSN, CCTN  Living Donor Coordinator  [phone]    "

## 2022-09-23 NOTE — TELEPHONE ENCOUNTER
Jose has had some time to think things over further and has some updated information on his recipient's readiness.  His recipient, Alexandro, is closer to being ready for transplant but is still likely weeks to months away from officially being ready.  Jose would like to give in advance of his recipient and complete his donation in October if possible.    We reviewed the NKR Advanced Donation Enhanced Voucher consent version 3.6 in its entirety.  Jose understands his recipient is not eligible for matching until AFTER Jose donates AND his recipient is cleared by the transplant team for active status in NKR.      R Standard Voucher Advanced Donation Consent was sent via [employer].  I gave instructions on how to return them to me for practitioner signature.      I will work on getting Jose active in NKR this week.  He knows how to reach out to me with any additional questions/concerns.      Rabia Thompson RN, BSN, CCTN  Living Donor Coordinator  [phone]

## 2022-09-27 ENCOUNTER — TELEPHONE (OUTPATIENT)
Dept: TRANSPLANT | Facility: CLINIC | Age: 62
End: 2022-09-27

## 2022-09-28 DIAGNOSIS — Z00.5 TRANSPLANT DONOR EVALUATION: ICD-10-CM

## 2022-09-28 DIAGNOSIS — Z52.4 ENCOUNTER FOR DONATION OF KIDNEY: ICD-10-CM

## 2022-09-28 DIAGNOSIS — Z52.4 KIDNEY DONOR: Primary | ICD-10-CM

## 2022-09-28 NOTE — TELEPHONE ENCOUNTER
Called Jose to inform him we received a match offer for him from the National Kidney Registry.  Reviewed the swap configuration, donor/recip ages, and proposed surgery date of 10/18/22.  Reviewed Jose's current health status, no changes noted.  Informed him of the additional blood draw requirements (kits for serology and final XM), the pre-op appointments, and the expected duration for surgery, hospitalization, and recovery. Jose verbalized understanding and agreement with above information.  I told him we would most likely have an update on the offer's viability and progression by end of this week.  We reviewed the NKR Donor Shield and NLDAC information. I will have SW or MARILYN reach out to him to review his options further.     Jose knows how to get a hold of me in the meantime if he has any additional questions/concerns.       Rabia Thompson RN, BSN, CCTN  Living Donor Coordinator  [phone]

## 2022-09-30 ENCOUNTER — TELEPHONE (OUTPATIENT)
Dept: TRANSPLANT | Facility: CLINIC | Age: 62
End: 2022-09-30

## 2022-09-30 ENCOUNTER — MEDICAL CORRESPONDENCE (OUTPATIENT)
Dept: HEALTH INFORMATION MANAGEMENT | Facility: CLINIC | Age: 62
End: 2022-09-30

## 2022-09-30 NOTE — TELEPHONE ENCOUNTER
D/I:  Phone conversation with Jose today.  We reviewed [employer] and [employer] wage reimbursement.  He will be using [employer] for travel expenses and he has the [employer] card.  He will use [employer] for wage reimbursement and he has the e-mail from them.  A:  Travel expenses and wage reimbursement programs are in place for him.  No other concerns identified today.  He feels ready to come to [place] for donation and continues to deny any pressure, inducement or coercion.  P:  MARILYN will remain available to assist with financial and MARILYN needs as they arise.  He anticipates he will be out of work for one month but will continue to notify me of his plans post donation.

## 2022-10-03 ENCOUNTER — HEALTH MAINTENANCE LETTER (OUTPATIENT)
Age: 62
End: 2022-10-03

## 2022-10-04 ENCOUNTER — EXTERNAL ORDER RESULTS (OUTPATIENT)
Dept: LAB | Facility: CLINIC | Age: 62
End: 2022-10-04

## 2022-10-07 LAB
HBV DNA SERPL QL NAA+PROBE: NON REACTIVE
HCV RNA SERPL QL NAA+PROBE: NON REACTIVE
HEP B SURFACE ABY (EXTERNAL): NON REACTIVE
HEP B SURFACE AGN (EXTERNAL): NON REACTIVE
HEPATITIS C ANTIBODY (EXTERNAL): NON REACTIVE
HIV1+2 AB SERPL QL IA: NON REACTIVE
HIV1+2 RNA SERPL QL NAA+PROBE: NON REACTIVE
Lab: NEGATIVE
Lab: NEGATIVE
Lab: NON REACTIVE
Lab: POSITIVE
Lab: POSITIVE
SCAN LAB RESULTS (EXTERNAL): NON REACTIVE

## 2022-10-16 LAB
ABO/RH(D): NORMAL
ANTIBODY SCREEN: NEGATIVE
SPECIMEN EXPIRATION DATE: NORMAL

## 2022-10-17 ENCOUNTER — OFFICE VISIT (OUTPATIENT)
Dept: TRANSPLANT | Facility: CLINIC | Age: 62
End: 2022-10-17
Attending: SURGERY

## 2022-10-17 ENCOUNTER — LAB (OUTPATIENT)
Dept: LAB | Facility: CLINIC | Age: 62
End: 2022-10-17

## 2022-10-17 ENCOUNTER — ALLIED HEALTH/NURSE VISIT (OUTPATIENT)
Dept: TRANSPLANT | Facility: CLINIC | Age: 62
End: 2022-10-17
Attending: SURGERY

## 2022-10-17 ENCOUNTER — ANCILLARY PROCEDURE (OUTPATIENT)
Dept: GENERAL RADIOLOGY | Facility: CLINIC | Age: 62
End: 2022-10-17

## 2022-10-17 ENCOUNTER — ANESTHESIA EVENT (OUTPATIENT)
Dept: SURGERY | Facility: CLINIC | Age: 62
DRG: 660 | End: 2022-10-17
Payer: COMMERCIAL

## 2022-10-17 VITALS
OXYGEN SATURATION: 97 % | SYSTOLIC BLOOD PRESSURE: 154 MMHG | WEIGHT: 243 LBS | BODY MASS INDEX: 30.37 KG/M2 | HEART RATE: 60 BPM | DIASTOLIC BLOOD PRESSURE: 82 MMHG

## 2022-10-17 DIAGNOSIS — Z20.822 ENCOUNTER FOR LABORATORY TESTING FOR COVID-19 VIRUS: ICD-10-CM

## 2022-10-17 DIAGNOSIS — Z00.5 TRANSPLANT DONOR EVALUATION: ICD-10-CM

## 2022-10-17 DIAGNOSIS — Z52.4 KIDNEY DONOR: Primary | ICD-10-CM

## 2022-10-17 DIAGNOSIS — Z00.5 TRANSPLANT DONOR EVALUATION: Primary | ICD-10-CM

## 2022-10-17 LAB
ALBUMIN UR-MCNC: NEGATIVE MG/DL
APPEARANCE UR: CLEAR
BILIRUB UR QL STRIP: NEGATIVE
COLOR UR AUTO: YELLOW
CREAT SERPL-MCNC: 0.93 MG/DL (ref 0.67–1.17)
GFR SERPL CREATININE-BSD FRML MDRD: >90 ML/MIN/1.73M2
GLUCOSE UR STRIP-MCNC: NEGATIVE MG/DL
HGB BLD-MCNC: 16.2 G/DL (ref 13.3–17.7)
HGB UR QL STRIP: NEGATIVE
KETONES UR STRIP-MCNC: NEGATIVE MG/DL
LEUKOCYTE ESTERASE UR QL STRIP: NEGATIVE
NITRATE UR QL: NEGATIVE
PH UR STRIP: 6 [PH] (ref 5–7)
RBC URINE: 2 /HPF
SARS-COV-2 RNA RESP QL NAA+PROBE: NEGATIVE
SP GR UR STRIP: 1.02 (ref 1–1.03)
UROBILINOGEN UR STRIP-MCNC: NORMAL MG/DL
WBC URINE: <1 /HPF

## 2022-10-17 PROCEDURE — 86850 RBC ANTIBODY SCREEN: CPT | Mod: 90 | Performed by: PATHOLOGY

## 2022-10-17 PROCEDURE — 99214 OFFICE O/P EST MOD 30 MIN: CPT | Performed by: SURGERY

## 2022-10-17 PROCEDURE — U0005 INFEC AGEN DETEC AMPLI PROBE: HCPCS | Mod: 90 | Performed by: PATHOLOGY

## 2022-10-17 PROCEDURE — U0003 INFECTIOUS AGENT DETECTION BY NUCLEIC ACID (DNA OR RNA); SEVERE ACUTE RESPIRATORY SYNDROME CORONAVIRUS 2 (SARS-COV-2) (CORONAVIRUS DISEASE [COVID-19]), AMPLIFIED PROBE TECHNIQUE, MAKING USE OF HIGH THROUGHPUT TECHNOLOGIES AS DESCRIBED BY CMS-2020-01-R: HCPCS | Mod: 90 | Performed by: PATHOLOGY

## 2022-10-17 PROCEDURE — 86901 BLOOD TYPING SEROLOGIC RH(D): CPT | Mod: 90 | Performed by: PATHOLOGY

## 2022-10-17 PROCEDURE — 85018 HEMOGLOBIN: CPT | Performed by: PATHOLOGY

## 2022-10-17 PROCEDURE — 99000 SPECIMEN HANDLING OFFICE-LAB: CPT | Performed by: PATHOLOGY

## 2022-10-17 PROCEDURE — 99207 PR SATISFY VISIT NUMBER: CPT | Performed by: NURSE PRACTITIONER

## 2022-10-17 PROCEDURE — 36415 COLL VENOUS BLD VENIPUNCTURE: CPT | Performed by: PATHOLOGY

## 2022-10-17 PROCEDURE — 82565 ASSAY OF CREATININE: CPT | Performed by: PATHOLOGY

## 2022-10-17 PROCEDURE — 81001 URINALYSIS AUTO W/SCOPE: CPT | Performed by: PATHOLOGY

## 2022-10-17 PROCEDURE — 86900 BLOOD TYPING SEROLOGIC ABO: CPT | Mod: 90 | Performed by: PATHOLOGY

## 2022-10-17 PROCEDURE — 71046 X-RAY EXAM CHEST 2 VIEWS: CPT | Performed by: RADIOLOGY

## 2022-10-17 ASSESSMENT — PAIN SCALES - GENERAL: PAINLEVEL: NO PAIN (0)

## 2022-10-17 ASSESSMENT — COPD QUESTIONNAIRES: COPD: 0

## 2022-10-17 ASSESSMENT — LIFESTYLE VARIABLES: TOBACCO_USE: 0

## 2022-10-17 NOTE — LETTER
10/17/2022     RE: Jose Pimentel  [address] [facility] [address]    Dear Colleague,    Thank you for referring your patient, Jose Pimentel, to the [facility]. Please see a copy of my visit note below.    Transplant Surgery H&P                                                        HPI:                                                      Mr. Pimentel is a 62 year old male who comes to clinic today for preop prior to planned laparoscopic kidney donation surgery. The patient was previously reviewed by the living donor multidisciplinary selection committee and found to be medically and psychosocially appropriate for voluntary kidney donation. The patient denies any feelings of being pressured to proceed with kidney donation.  Health events since donor evaluation: None    Special considerations:   Constipation: no  PONV: no  History of Urinary retention? no  Significant neck/back/joint concerns for lateral decubitus positioning?: No  Protestant: No    MEDICAL HISTORY:                                                      Patient Active Problem List    Diagnosis Date Noted     Hepatitis C, chronic (H) 06/08/2022     Priority: Medium     Transplant donor evaluation 05/06/2022     Priority: Medium      Past Medical History:   Diagnosis Date     Borderline blood pressure      Colitis      Crohn's disease (H)      Infection due to 2019 novel coronavirus 01/2022     Nonspecific abnormal electrocardiogram (ECG) (EKG)     per pt- irregular rate     Past Surgical History:   Procedure Laterality Date     bicep reattachment       KNEE SURGERY       ZZC FIXATION OF ABDOMINAL RIB       Current Outpatient Medications   Medication Sig Dispense Refill     calcium citrate (CITRACAL) 950 (200 Ca) MG tablet Take 1 tablet by mouth daily       Cholecalciferol 10 MCG (400 UNIT) CAPS        Flaxseed Oil (LINSEED OIL) OIL        hydrochlorothiazide (HYDRODIURIL) 25 MG tablet Take 25 mg by mouth        mesalamine (LIALDA) 1.2 g DR tablet Take 1,200 mg by mouth       Multiple Vitamin (MULTI-VITAMINS) TABS Take 1 tablet by mouth daily       omega 3 1200 MG CAPS        OTC products: None, except as noted above  Allergies   Allergen Reactions     Sulfa Drugs Hives      Social History     Tobacco Use     Smoking status: Never     Smokeless tobacco: Former   Substance Use Topics     Alcohol use: Yes     History   Drug Use Unknown       REVIEW OF SYSTEMS:                                                    CONSTITUTIONAL: NEGATIVE for fever, chills, change in weight  INTEGUMENTARY/SKIN: NEGATIVE for worrisome rashes, moles or lesions  EYES: NEGATIVE for vision changes or irritation  ENT/MOUTH: NEGATIVE for ear, mouth and throat problems  RESP: NEGATIVE for significant cough or SOB  CV: NEGATIVE for chest pain, palpitations or peripheral edema  GI: NEGATIVE for nausea, abdominal pain, heartburn, or change in bowel habits  : NEGATIVE for frequency, dysuria, or hematuria  MUSCULOSKELETAL: NEGATIVE for significant arthralgias or myalgia  NEURO: NEGATIVE for weakness, dizziness or paresthesias  ENDOCRINE: NEGATIVE for temperature intolerance, skin/hair changes  HEME: NEGATIVE for bleeding problems  PSYCHIATRIC: NEGATIVE for changes in mood or affect    EXAM:                                                    There were no vitals taken for this visit.    GENERAL APPEARANCE: healthy, alert and no distress     EYES: EOMI,  PERRL     HENT: ear canals and TM's normal and nose and mouth without ulcers or lesions     NECK: no adenopathy, no asymmetry, masses, or scars and thyroid normal to palpation     RESP: lungs clear to auscultation - no rales, rhonchi or wheezes     CV: regular rates and rhythm, normal S1 S2, no S3 or S4 and no murmur, click or rub     ABDOMEN:  soft, nontender, no HSM or masses and bowel sounds normal     MS: extremities normal- no gross deformities noted, no evidence of inflammation in joints, FROM in all  "extremities.     SKIN: no suspicious lesions or rashes     NEURO: Normal strength and tone, sensory exam grossly normal, mentation intact and speech normal     PSYCH: mentation appears normal. and affect normal/bright     LYMPHATICS: No cervical adenopathy    DIAGNOSTICS:                                                      EK22: \"Sinus bradycardia with 1st degree A-V block   Possible Left atrial enlargement   ST & T wave abnormality, consider anterolateral ischemia   Abnormal ECG.\"   Chest Xray: negative 10/17/22  Echo: 22 EF 60% and no valvular abnormalities.     Labs Resulted Today:   Results for orders placed or performed in visit on 10/17/22   XR Chest 2 Views     Status: None    Narrative    Exam: XR CHEST 2 VIEWS, 10/17/2022 10:10 AM    Indication: Transplant donor evaluation    Comparison: 2022    Findings:   The cardiomediastinal silhouette and pulmonary vasculature are within  normal limits. No pleural effusion or pneumothorax. No focal airspace  opacity. Lateral left rib plate and screw fixation.      Impression    Impression: No acute airspace disease.    DALJIT SHEPARD DO         SYSTEM ID:  [id number]   Results for orders placed or performed in visit on 10/17/22   UA with Microscopic reflex to Culture     Status: Normal    Specimen: Urine, Midstream   Result Value Ref Range    Color Urine Yellow Colorless, Straw, Light Yellow, Yellow    Appearance Urine Clear Clear    Glucose Urine Negative Negative mg/dL    Bilirubin Urine Negative Negative    Ketones Urine Negative Negative mg/dL    Specific Gravity Urine 1.020 1.003 - 1.035    Blood Urine Negative Negative    pH Urine 6.0 5.0 - 7.0    Protein Albumin Urine Negative Negative mg/dL    Urobilinogen Urine Normal Normal, 2.0 mg/dL    Nitrite Urine Negative Negative    Leukocyte Esterase Urine Negative Negative    RBC Urine 2 <=2 /HPF    WBC Urine <1 <=5 /HPF    Narrative    Urine Culture not indicated   Hemoglobin     Status: Normal "   Result Value Ref Range    Hemoglobin 16.2 13.3 - 17.7 g/dL   Creatinine     Status: Normal   Result Value Ref Range    Creatinine 0.93 0.67 - 1.17 mg/dL    GFR Estimate >90 >60 mL/min/1.73m2   Asymptomatic COVID-19 Virus (Coronavirus) by PCR Nose     Status: Normal    Specimen: Nose; Swab   Result Value Ref Range    SARS CoV2 PCR Negative Negative    Narrative    Testing was performed using the Xpert Xpress SARS-CoV-2 Assay on the  Cepheid Gene-Xpert Instrument Systems. Additional information about  this Emergency Use Authorization (EUA) assay can be found via the Lab  Guide. This test should be ordered for the detection of SARS-CoV-2 in  individuals who meet SARS-CoV-2 clinical and/or epidemiological  criteria. Test performance is unknown in asymptomatic patients. This  test is for in vitro diagnostic use under the FDA EUA for  laboratories certified under CLIA to perform high complexity testing.  This test has not been FDA cleared or approved. A negative result  does not rule out the presence of PCR inhibitors in the specimen or  target RNA in concentration below the limit of detection for the  assay. The possibility of a false negative should be considered if  the patient's recent exposure or clinical presentation suggests  COVID-19. This test was validated by the [facility]. This laboratory is certified under  the Clinical Laboratory Improvement Amendments of 1988 (CLIA-88) as  qualified to perform high complexity laboratory testing.     ABO/Rh type and screen     Status: None (In process)    Narrative    The following orders were created for panel order ABO/Rh type and screen.  Procedure                               Abnormality         Status                     ---------                               -----------         ------                     Adult Type and Screen[[id number]]                            In process                   Please view results for  these tests on the individual orders.     Recent Labs   Lab Test 10/17/22  1010 06/02/22  0710 06/02/22  0646   HGB 16.2  --  16.3   PLT  --   --  186   INR  --  0.90  --    NA  --   --  139   POTASSIUM  --   --  3.5   CR 0.93  --  0.94   A1C  --   --  5.6      Assessment:                                                    Healthy voluntary kidney donor    There have been no significant intercurrent medical problems or change of intent in proceeding with kidney donation as scheduled on 10/18/22.    1. Labs reviewed and within normal limits: Yes  2. EKG (6/2/22): sinus bradycardia  3. Paired Exchange case: Yes  4. ABO= O POS  5. Laterality: left  6. Outstanding issues: None    Plan:                                                      1. Consent: Not Done  2. Outstanding issues: None    Signed Electronically by: GREGORIO Tam CNP

## 2022-10-17 NOTE — ANESTHESIA PREPROCEDURE EVALUATION
Anesthesia Pre-Procedure Evaluation    Patient: Jose Pimentel   MRN: [id number] : 1960        Procedure : Procedure(s):  Laparoscopic Hand Assisted Left Kidney Living Unrelated Transplant Donor (Kidney Paired Exchange, Standard Voucher Donor, Recipient at another center)          Past Medical History:   Diagnosis Date     Borderline blood pressure      Colitis      Crohn's disease (H)      Infection due to 2019 novel coronavirus 2022     Nonspecific abnormal electrocardiogram (ECG) (EKG)     per pt- irregular rate      Past Surgical History:   Procedure Laterality Date     bicep reattachment       KNEE SURGERY       ZZC FIXATION OF ABDOMINAL RIB        Allergies   Allergen Reactions     Sulfa Drugs Hives      Social History     Tobacco Use     Smoking status: Never     Smokeless tobacco: Former   Substance Use Topics     Alcohol use: Yes      Wt Readings from Last 1 Encounters:   10/17/22 110.2 kg (243 lb)        Anesthesia Evaluation   Pt has had prior anesthetic. Type: MAC and General.    No history of anesthetic complications       ROS/MED HX  ENT/Pulmonary:    (-) tobacco use, asthma and COPD   Neurologic:  - neg neurologic ROS     Cardiovascular:     (+) hypertension-----Previous cardiac testing   Echo: Date:  Results:  EF: 60%  NL valvular structure  Moderate LVH  Stress Test: Date: Results:  Exercise stress perfusion scan 2018: negative for inducible ischemia  12.8 METS  ECG Reviewed: Date: Results:    Cath: Date: Results:      METS/Exercise Tolerance: >4 METS    Hematologic: Comments: Hb:16.2      Musculoskeletal:       GI/Hepatic:     (+) hepatitis resolved Inflammatory bowel disease (Ileocolonic Crohn's Disease/ulcerative colitis -- stable), hepatitis type C,     Renal/Genitourinary:       Endo:       Psychiatric/Substance Use:       Infectious Disease:       Malignancy:       Other:            Physical Exam    Airway        Mallampati: II   TM distance: > 3 FB   Neck ROM: full   Mouth  opening: > 3 cm    Respiratory Devices and Support         Dental  no notable dental history   Comment: 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        Cardiovascular          Rhythm and rate: regular and normal     Pulmonary           breath sounds clear to auscultation           OUTSIDE LABS:  CBC:   Lab Results   Component Value Date    WBC 5.8 06/02/2022    HGB 16.2 10/17/2022    HGB 16.3 06/02/2022    HCT 47.1 06/02/2022     06/02/2022     BMP:   Lab Results   Component Value Date     06/02/2022    POTASSIUM 3.5 06/02/2022    CHLORIDE 104 06/02/2022    CO2 27 06/02/2022    BUN 15 06/02/2022    CR 0.93 10/17/2022    CR 0.94 06/02/2022     (H) 06/02/2022     COAGS:   Lab Results   Component Value Date    INR 0.90 06/02/2022     POC: No results found for: BGM, HCG, HCGS  HEPATIC:   Lab Results   Component Value Date    ALBUMIN 3.9 06/02/2022    PROTTOTAL 7.2 06/02/2022    ALT 43 06/02/2022    AST 29 06/02/2022    ALKPHOS 67 06/02/2022    BILITOTAL 0.8 06/02/2022     OTHER:   Lab Results   Component Value Date    A1C 5.6 06/02/2022    JERED 9.0 06/02/2022    PHOS 2.7 06/02/2022       Anesthesia Plan    ASA Status:  2   NPO Status:  NPO Appropriate    Anesthesia Type: General.     - Airway: ETT   Induction: Intravenous.   Maintenance: Balanced.   Techniques and Equipment:     - Lines/Monitors: BIS, 2nd IV     Consents    Anesthesia Plan(s) and associated risks, benefits, and realistic alternatives discussed. Questions answered and patient/representative(s) expressed understanding.    - Discussed:     - Discussed with:  Patient      - Extended Intubation/Ventilatory Support Discussed: No.      - Patient is DNR/DNI Status: No    Use of blood products discussed: Yes.     - Discussed with: Patient.     - Consented: consented to blood products            Reason for refusal: other.     Postoperative Care    Pain management: IV analgesics, Oral pain medications, Peripheral nerve block (Single Shot).   PONV prophylaxis:  Ondansetron (or other 5HT-3), Dexamethasone or Solumedrol     Comments:                Agata Montoya MD

## 2022-10-17 NOTE — H&P (VIEW-ONLY)
Transplant Surgery H&P                                                        HPI:                                                      Mr. Pimentel is a 62 year old male who comes to clinic today for preop prior to planned laparoscopic kidney donation surgery. The patient was previously reviewed by the living donor multidisciplinary selection committee and found to be medically and psychosocially appropriate for voluntary kidney donation. The patient denies any feelings of being pressured to proceed with kidney donation.  Health events since donor evaluation: None    Special considerations:   Constipation: no  PONV: no  History of Urinary retention? no  Significant neck/back/joint concerns for lateral decubitus positioning?: No  Rastafari: No    MEDICAL HISTORY:                                                      Patient Active Problem List    Diagnosis Date Noted     Hepatitis C, chronic (H) 06/08/2022     Priority: Medium     Transplant donor evaluation 05/06/2022     Priority: Medium      Past Medical History:   Diagnosis Date     Borderline blood pressure      Colitis      Crohn's disease (H)      Infection due to 2019 novel coronavirus 01/2022     Nonspecific abnormal electrocardiogram (ECG) (EKG)     per pt- irregular rate     Past Surgical History:   Procedure Laterality Date     bicep reattachment       KNEE SURGERY       ZZC FIXATION OF ABDOMINAL RIB       Current Outpatient Medications   Medication Sig Dispense Refill     calcium citrate (CITRACAL) 950 (200 Ca) MG tablet Take 1 tablet by mouth daily       Cholecalciferol 10 MCG (400 UNIT) CAPS        Flaxseed Oil (LINSEED OIL) OIL        hydrochlorothiazide (HYDRODIURIL) 25 MG tablet Take 25 mg by mouth       mesalamine (LIALDA) 1.2 g DR tablet Take 1,200 mg by mouth       Multiple Vitamin (MULTI-VITAMINS) TABS Take 1 tablet by mouth daily       omega 3 1200 MG CAPS        OTC products: None, except as noted above  Allergies   Allergen Reactions      Sulfa Drugs Hives      Social History     Tobacco Use     Smoking status: Never     Smokeless tobacco: Former   Substance Use Topics     Alcohol use: Yes     History   Drug Use Unknown       REVIEW OF SYSTEMS:                                                    CONSTITUTIONAL: NEGATIVE for fever, chills, change in weight  INTEGUMENTARY/SKIN: NEGATIVE for worrisome rashes, moles or lesions  EYES: NEGATIVE for vision changes or irritation  ENT/MOUTH: NEGATIVE for ear, mouth and throat problems  RESP: NEGATIVE for significant cough or SOB  CV: NEGATIVE for chest pain, palpitations or peripheral edema  GI: NEGATIVE for nausea, abdominal pain, heartburn, or change in bowel habits  : NEGATIVE for frequency, dysuria, or hematuria  MUSCULOSKELETAL: NEGATIVE for significant arthralgias or myalgia  NEURO: NEGATIVE for weakness, dizziness or paresthesias  ENDOCRINE: NEGATIVE for temperature intolerance, skin/hair changes  HEME: NEGATIVE for bleeding problems  PSYCHIATRIC: NEGATIVE for changes in mood or affect    EXAM:                                                    There were no vitals taken for this visit.    GENERAL APPEARANCE: healthy, alert and no distress     EYES: EOMI,  PERRL     HENT: ear canals and TM's normal and nose and mouth without ulcers or lesions     NECK: no adenopathy, no asymmetry, masses, or scars and thyroid normal to palpation     RESP: lungs clear to auscultation - no rales, rhonchi or wheezes     CV: regular rates and rhythm, normal S1 S2, no S3 or S4 and no murmur, click or rub     ABDOMEN:  soft, nontender, no HSM or masses and bowel sounds normal     MS: extremities normal- no gross deformities noted, no evidence of inflammation in joints, FROM in all extremities.     SKIN: no suspicious lesions or rashes     NEURO: Normal strength and tone, sensory exam grossly normal, mentation intact and speech normal     PSYCH: mentation appears normal. and affect normal/bright     LYMPHATICS: No cervical  "adenopathy    DIAGNOSTICS:                                                      EK22: \"Sinus bradycardia with 1st degree A-V block   Possible Left atrial enlargement   ST & T wave abnormality, consider anterolateral ischemia   Abnormal ECG.\"   Chest Xray: negative 10/17/22  Echo: 22 EF 60% and no valvular abnormalities.     Labs Resulted Today:   Results for orders placed or performed in visit on 10/17/22   XR Chest 2 Views     Status: None    Narrative    Exam: XR CHEST 2 VIEWS, 10/17/2022 10:10 AM    Indication: Transplant donor evaluation    Comparison: 2022    Findings:   The cardiomediastinal silhouette and pulmonary vasculature are within  normal limits. No pleural effusion or pneumothorax. No focal airspace  opacity. Lateral left rib plate and screw fixation.      Impression    Impression: No acute airspace disease.    DALJIT SHEPARD DO         SYSTEM ID:  [id number]   Results for orders placed or performed in visit on 10/17/22   UA with Microscopic reflex to Culture     Status: Normal    Specimen: Urine, Midstream   Result Value Ref Range    Color Urine Yellow Colorless, Straw, Light Yellow, Yellow    Appearance Urine Clear Clear    Glucose Urine Negative Negative mg/dL    Bilirubin Urine Negative Negative    Ketones Urine Negative Negative mg/dL    Specific Gravity Urine 1.020 1.003 - 1.035    Blood Urine Negative Negative    pH Urine 6.0 5.0 - 7.0    Protein Albumin Urine Negative Negative mg/dL    Urobilinogen Urine Normal Normal, 2.0 mg/dL    Nitrite Urine Negative Negative    Leukocyte Esterase Urine Negative Negative    RBC Urine 2 <=2 /HPF    WBC Urine <1 <=5 /HPF    Narrative    Urine Culture not indicated   Hemoglobin     Status: Normal   Result Value Ref Range    Hemoglobin 16.2 13.3 - 17.7 g/dL   Creatinine     Status: Normal   Result Value Ref Range    Creatinine 0.93 0.67 - 1.17 mg/dL    GFR Estimate >90 >60 mL/min/1.73m2   Asymptomatic COVID-19 Virus (Coronavirus) by PCR Nose     " Status: Normal    Specimen: Nose; Swab   Result Value Ref Range    SARS CoV2 PCR Negative Negative    Narrative    Testing was performed using the Xpert Xpress SARS-CoV-2 Assay on the  Cepheid Gene-Xpert Instrument Systems. Additional information about  this Emergency Use Authorization (EUA) assay can be found via the Lab  Guide. This test should be ordered for the detection of SARS-CoV-2 in  individuals who meet SARS-CoV-2 clinical and/or epidemiological  criteria. Test performance is unknown in asymptomatic patients. This  test is for in vitro diagnostic use under the FDA EUA for  laboratories certified under CLIA to perform high complexity testing.  This test has not been FDA cleared or approved. A negative result  does not rule out the presence of PCR inhibitors in the specimen or  target RNA in concentration below the limit of detection for the  assay. The possibility of a false negative should be considered if  the patient's recent exposure or clinical presentation suggests  COVID-19. This test was validated by the [facility]. This laboratory is certified under  the Clinical Laboratory Improvement Amendments of 1988 (CLIA-88) as  qualified to perform high complexity laboratory testing.     ABO/Rh type and screen     Status: None (In process)    Narrative    The following orders were created for panel order ABO/Rh type and screen.  Procedure                               Abnormality         Status                     ---------                               -----------         ------                     Adult Type and Screen[[id number]]                            In process                   Please view results for these tests on the individual orders.     Recent Labs   Lab Test 10/17/22  1010 06/02/22  0710 06/02/22  0646   HGB 16.2  --  16.3   PLT  --   --  186   INR  --  0.90  --    NA  --   --  139   POTASSIUM  --   --  3.5   CR 0.93  --  0.94   A1C  --   --   5.6      Assessment:                                                    Healthy voluntary kidney donor    There have been no significant intercurrent medical problems or change of intent in proceeding with kidney donation as scheduled on 10/18/22.    1. Labs reviewed and within normal limits: Yes  2. EKG (6/2/22): sinus bradycardia  3. Paired Exchange case: Yes  4. ABO= O POS  5. Laterality: left  6. Outstanding issues: None    Plan:                                                      1. Consent: Not Done  2. Outstanding issues: None    Signed Electronically by: GREGORIO Tam CNP

## 2022-10-17 NOTE — PROGRESS NOTES
Transplant Surgery H&P                                                        HPI:                                                      Mr. Pimentel is a 62 year old male who comes to clinic today for preop prior to planned laparoscopic kidney donation surgery. The patient was previously reviewed by the living donor multidisciplinary selection committee and found to be medically and psychosocially appropriate for voluntary kidney donation. The patient denies any feelings of being pressured to proceed with kidney donation.  Health events since donor evaluation: None    Special considerations:   Constipation: no  PONV: no  History of Urinary retention? no  Significant neck/back/joint concerns for lateral decubitus positioning?: No  Faith: No    MEDICAL HISTORY:                                                      Patient Active Problem List    Diagnosis Date Noted     Hepatitis C, chronic (H) 06/08/2022     Priority: Medium     Transplant donor evaluation 05/06/2022     Priority: Medium      Past Medical History:   Diagnosis Date     Borderline blood pressure      Colitis      Crohn's disease (H)      Infection due to 2019 novel coronavirus 01/2022     Nonspecific abnormal electrocardiogram (ECG) (EKG)     per pt- irregular rate     Past Surgical History:   Procedure Laterality Date     bicep reattachment       KNEE SURGERY       ZZC FIXATION OF ABDOMINAL RIB       Current Outpatient Medications   Medication Sig Dispense Refill     calcium citrate (CITRACAL) 950 (200 Ca) MG tablet Take 1 tablet by mouth daily       Cholecalciferol 10 MCG (400 UNIT) CAPS        Flaxseed Oil (LINSEED OIL) OIL        hydrochlorothiazide (HYDRODIURIL) 25 MG tablet Take 25 mg by mouth       mesalamine (LIALDA) 1.2 g DR tablet Take 1,200 mg by mouth       Multiple Vitamin (MULTI-VITAMINS) TABS Take 1 tablet by mouth daily       omega 3 1200 MG CAPS        OTC products: None, except as noted above  Allergies   Allergen Reactions      Sulfa Drugs Hives      Social History     Tobacco Use     Smoking status: Never     Smokeless tobacco: Former   Substance Use Topics     Alcohol use: Yes     History   Drug Use Unknown       REVIEW OF SYSTEMS:                                                    CONSTITUTIONAL: NEGATIVE for fever, chills, change in weight  INTEGUMENTARY/SKIN: NEGATIVE for worrisome rashes, moles or lesions  EYES: NEGATIVE for vision changes or irritation  ENT/MOUTH: NEGATIVE for ear, mouth and throat problems  RESP: NEGATIVE for significant cough or SOB  CV: NEGATIVE for chest pain, palpitations or peripheral edema  GI: NEGATIVE for nausea, abdominal pain, heartburn, or change in bowel habits  : NEGATIVE for frequency, dysuria, or hematuria  MUSCULOSKELETAL: NEGATIVE for significant arthralgias or myalgia  NEURO: NEGATIVE for weakness, dizziness or paresthesias  ENDOCRINE: NEGATIVE for temperature intolerance, skin/hair changes  HEME: NEGATIVE for bleeding problems  PSYCHIATRIC: NEGATIVE for changes in mood or affect    EXAM:                                                    There were no vitals taken for this visit.    GENERAL APPEARANCE: healthy, alert and no distress     EYES: EOMI,  PERRL     HENT: ear canals and TM's normal and nose and mouth without ulcers or lesions     NECK: no adenopathy, no asymmetry, masses, or scars and thyroid normal to palpation     RESP: lungs clear to auscultation - no rales, rhonchi or wheezes     CV: regular rates and rhythm, normal S1 S2, no S3 or S4 and no murmur, click or rub     ABDOMEN:  soft, nontender, no HSM or masses and bowel sounds normal     MS: extremities normal- no gross deformities noted, no evidence of inflammation in joints, FROM in all extremities.     SKIN: no suspicious lesions or rashes     NEURO: Normal strength and tone, sensory exam grossly normal, mentation intact and speech normal     PSYCH: mentation appears normal. and affect normal/bright     LYMPHATICS: No cervical  "adenopathy    DIAGNOSTICS:                                                      EK22: \"Sinus bradycardia with 1st degree A-V block   Possible Left atrial enlargement   ST & T wave abnormality, consider anterolateral ischemia   Abnormal ECG.\"   Chest Xray: negative 10/17/22  Echo: 22 EF 60% and no valvular abnormalities.     Labs Resulted Today:   Results for orders placed or performed in visit on 10/17/22   XR Chest 2 Views     Status: None    Narrative    Exam: XR CHEST 2 VIEWS, 10/17/2022 10:10 AM    Indication: Transplant donor evaluation    Comparison: 2022    Findings:   The cardiomediastinal silhouette and pulmonary vasculature are within  normal limits. No pleural effusion or pneumothorax. No focal airspace  opacity. Lateral left rib plate and screw fixation.      Impression    Impression: No acute airspace disease.    DALJIT SHEPARD DO         SYSTEM ID:  [id number]   Results for orders placed or performed in visit on 10/17/22   UA with Microscopic reflex to Culture     Status: Normal    Specimen: Urine, Midstream   Result Value Ref Range    Color Urine Yellow Colorless, Straw, Light Yellow, Yellow    Appearance Urine Clear Clear    Glucose Urine Negative Negative mg/dL    Bilirubin Urine Negative Negative    Ketones Urine Negative Negative mg/dL    Specific Gravity Urine 1.020 1.003 - 1.035    Blood Urine Negative Negative    pH Urine 6.0 5.0 - 7.0    Protein Albumin Urine Negative Negative mg/dL    Urobilinogen Urine Normal Normal, 2.0 mg/dL    Nitrite Urine Negative Negative    Leukocyte Esterase Urine Negative Negative    RBC Urine 2 <=2 /HPF    WBC Urine <1 <=5 /HPF    Narrative    Urine Culture not indicated   Hemoglobin     Status: Normal   Result Value Ref Range    Hemoglobin 16.2 13.3 - 17.7 g/dL   Creatinine     Status: Normal   Result Value Ref Range    Creatinine 0.93 0.67 - 1.17 mg/dL    GFR Estimate >90 >60 mL/min/1.73m2   Asymptomatic COVID-19 Virus (Coronavirus) by PCR Nose     " Status: Normal    Specimen: Nose; Swab   Result Value Ref Range    SARS CoV2 PCR Negative Negative    Narrative    Testing was performed using the Xpert Xpress SARS-CoV-2 Assay on the  Cepheid Gene-Xpert Instrument Systems. Additional information about  this Emergency Use Authorization (EUA) assay can be found via the Lab  Guide. This test should be ordered for the detection of SARS-CoV-2 in  individuals who meet SARS-CoV-2 clinical and/or epidemiological  criteria. Test performance is unknown in asymptomatic patients. This  test is for in vitro diagnostic use under the FDA EUA for  laboratories certified under CLIA to perform high complexity testing.  This test has not been FDA cleared or approved. A negative result  does not rule out the presence of PCR inhibitors in the specimen or  target RNA in concentration below the limit of detection for the  assay. The possibility of a false negative should be considered if  the patient's recent exposure or clinical presentation suggests  COVID-19. This test was validated by the [facility]. This laboratory is certified under  the Clinical Laboratory Improvement Amendments of 1988 (CLIA-88) as  qualified to perform high complexity laboratory testing.     ABO/Rh type and screen     Status: None (In process)    Narrative    The following orders were created for panel order ABO/Rh type and screen.  Procedure                               Abnormality         Status                     ---------                               -----------         ------                     Adult Type and Screen[[id number]]                            In process                   Please view results for these tests on the individual orders.     Recent Labs   Lab Test 10/17/22  1010 06/02/22  0710 06/02/22  0646   HGB 16.2  --  16.3   PLT  --   --  186   INR  --  0.90  --    NA  --   --  139   POTASSIUM  --   --  3.5   CR 0.93  --  0.94   A1C  --   --   5.6      Assessment:                                                    Healthy voluntary kidney donor    There have been no significant intercurrent medical problems or change of intent in proceeding with kidney donation as scheduled on 10/18/22.    1. Labs reviewed and within normal limits: Yes  2. EKG (6/2/22): sinus bradycardia  3. Paired Exchange case: Yes  4. ABO= O POS  5. Laterality: left  6. Outstanding issues: None    Plan:                                                      1. Consent: Not Done  2. Outstanding issues: None    Signed Electronically by: GREGORIO Tam CNP     right upper quadrant

## 2022-10-17 NOTE — LETTER
10/17/2022         RE: Jose Pimentel  [address]  [facility] [address]        Dear Colleague,    Thank you for referring your patient, Jose Pimentel, to the [facility]. Please see a copy of my visit note below.    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 Risks and complications of the procedure including surgical and medical risks were discussed including but not limited to the rare complication of mortality. Patient understood the procedure with it's attendant risks and and provided informed consent.         Total time: 30 min  Counseling time: 20 min

## 2022-10-17 NOTE — NURSING NOTE
Saw Jose in clinic for donor nephrectomy pre-op.  Surgery is scheduled for 10/18/22, with a 3C check-in time of 0500.      I reviewed:    Surgery check-in procedure    Day -1 shower, ensure clear, and diet guidelines    Expectations for day of surgery including pre and post-op     ERAS teaching information     Incentive Spirometer    Post-op lifting restriction and the required donor follow up    Jose verbalized understanding of the above information.  He remains extremely motivated and excited to donate.  I gave him the donor gift items. Questions answered.  Jose knows how to contact me with any further questions or concerns.    KPD Information: Jose will be donating to another transplant center's recipient as part of a kidney exchange.  NKR Swap number [id number]. Recipient center is [place].  Jose is a NKR advanced standard voucher donor.  We reviewed the SRTR datasheet for [facility]'s kidney recipient data.  The patient acknowledged understanding of this.  We reviewed that the donor's bills will be paid for by insurance of the matched recipient.    We reviewed that the patient has a right to withdraw from kidney donation at any time, for any reason. We reviewed the KPD program s remedy for failed KPD exchanges and that the remedy does not include any additional priority for the paired candidate on the  donor waiting list.  We reviewed how the kidneys are transported to the matched recipient's in the exchanges.  I explained that the process is monitored closely, in fact a GPS device will be used but the process does have risk.  We reviewed that the shipped kidney has a risk of getting lost or damaged while being shipped.    We reviewed that the outcome of the patient's matched recipient may be different from the intended recipient's outcome.             Covid test #2 and CXR: Negative    Rabia Thompson RN, BSN, CCTN  Living Donor Coordinator  [phone]

## 2022-10-18 ENCOUNTER — HOSPITAL ENCOUNTER (INPATIENT)
Facility: CLINIC | Age: 62
LOS: 3 days | Discharge: HOME | DRG: 660 | End: 2022-10-21
Attending: SURGERY | Admitting: SURGERY
Payer: COMMERCIAL

## 2022-10-18 ENCOUNTER — ANESTHESIA (OUTPATIENT)
Dept: SURGERY | Facility: CLINIC | Age: 62
DRG: 660 | End: 2022-10-18
Payer: COMMERCIAL

## 2022-10-18 DIAGNOSIS — Z00.5 TRANSPLANT DONOR EVALUATION: Primary | ICD-10-CM

## 2022-10-18 DIAGNOSIS — Z52.4 ENCOUNTER FOR DONATION OF KIDNEY: ICD-10-CM

## 2022-10-18 LAB
ANION GAP SERPL CALCULATED.3IONS-SCNC: 9 MMOL/L (ref 7–15)
BUN SERPL-MCNC: 17.4 MG/DL (ref 8–23)
CALCIUM SERPL-MCNC: 9.1 MG/DL (ref 8.8–10.2)
CHLORIDE SERPL-SCNC: 109 MMOL/L (ref 98–107)
CK SERPL-CCNC: 103 U/L (ref 39–308)
CREAT SERPL-MCNC: 0.87 MG/DL (ref 0.67–1.17)
DEPRECATED HCO3 PLAS-SCNC: 23 MMOL/L (ref 22–29)
ERYTHROCYTE [DISTWIDTH] IN BLOOD BY AUTOMATED COUNT: 12.7 % (ref 10–15)
GFR SERPL CREATININE-BSD FRML MDRD: >90 ML/MIN/1.73M2
GLUCOSE BLDC GLUCOMTR-MCNC: 86 MG/DL (ref 70–99)
GLUCOSE SERPL-MCNC: 80 MG/DL (ref 70–99)
HCT VFR BLD AUTO: 43.1 % (ref 40–53)
HGB BLD-MCNC: 14.7 G/DL (ref 13.3–17.7)
HOLD SPECIMEN: NORMAL
MCH RBC QN AUTO: 30.8 PG (ref 26.5–33)
MCHC RBC AUTO-ENTMCNC: 34.1 G/DL (ref 31.5–36.5)
MCV RBC AUTO: 90 FL (ref 78–100)
PLATELET # BLD AUTO: 170 10E3/UL (ref 150–450)
POTASSIUM SERPL-SCNC: 4.1 MMOL/L (ref 3.4–5.3)
RBC # BLD AUTO: 4.78 10E6/UL (ref 4.4–5.9)
SODIUM SERPL-SCNC: 141 MMOL/L (ref 136–145)
WBC # BLD AUTO: 14.9 10E3/UL (ref 4–11)

## 2022-10-18 PROCEDURE — 250N000013 HC RX MED GY IP 250 OP 250 PS 637: Performed by: STUDENT IN AN ORGANIZED HEALTH CARE EDUCATION/TRAINING PROGRAM

## 2022-10-18 PROCEDURE — 250N000009 HC RX 250: Performed by: SURGERY

## 2022-10-18 PROCEDURE — 250N000025 HC SEVOFLURANE, PER MIN: Performed by: SURGERY

## 2022-10-18 PROCEDURE — 80048 BASIC METABOLIC PNL TOTAL CA: CPT | Performed by: ANESTHESIOLOGY

## 2022-10-18 PROCEDURE — 360N000077 HC SURGERY LEVEL 4, PER MIN: Performed by: SURGERY

## 2022-10-18 PROCEDURE — 85027 COMPLETE CBC AUTOMATED: CPT | Performed by: NURSE PRACTITIONER

## 2022-10-18 PROCEDURE — 36415 COLL VENOUS BLD VENIPUNCTURE: CPT | Performed by: NURSE PRACTITIONER

## 2022-10-18 PROCEDURE — 250N000011 HC RX IP 250 OP 636: Performed by: STUDENT IN AN ORGANIZED HEALTH CARE EDUCATION/TRAINING PROGRAM

## 2022-10-18 PROCEDURE — C9290 INJ, BUPIVACAINE LIPOSOME: HCPCS | Performed by: STUDENT IN AN ORGANIZED HEALTH CARE EDUCATION/TRAINING PROGRAM

## 2022-10-18 PROCEDURE — 82550 ASSAY OF CK (CPK): CPT | Performed by: NURSE PRACTITIONER

## 2022-10-18 PROCEDURE — 50547 LAPARO REMOVAL DONOR KIDNEY: CPT | Mod: LT | Performed by: SURGERY

## 2022-10-18 PROCEDURE — 0TT70ZZ RESECTION OF LEFT URETER, OPEN APPROACH: ICD-10-PCS | Performed by: SURGERY

## 2022-10-18 PROCEDURE — 0TT10ZZ RESECTION OF LEFT KIDNEY, OPEN APPROACH: ICD-10-PCS | Performed by: SURGERY

## 2022-10-18 PROCEDURE — 370N000017 HC ANESTHESIA TECHNICAL FEE, PER MIN: Performed by: SURGERY

## 2022-10-18 PROCEDURE — 120N000011 HC R&B TRANSPLANT UMMC

## 2022-10-18 PROCEDURE — 250N000009 HC RX 250: Performed by: STUDENT IN AN ORGANIZED HEALTH CARE EDUCATION/TRAINING PROGRAM

## 2022-10-18 PROCEDURE — 250N000013 HC RX MED GY IP 250 OP 250 PS 637: Performed by: SURGERY

## 2022-10-18 PROCEDURE — 88240 CELL CRYOPRESERVE/STORAGE: CPT | Performed by: NURSE PRACTITIONER

## 2022-10-18 PROCEDURE — 250N000013 HC RX MED GY IP 250 OP 250 PS 637: Performed by: NURSE PRACTITIONER

## 2022-10-18 PROCEDURE — 999N000141 HC STATISTIC PRE-PROCEDURE NURSING ASSESSMENT: Performed by: SURGERY

## 2022-10-18 PROCEDURE — 258N000003 HC RX IP 258 OP 636: Performed by: STUDENT IN AN ORGANIZED HEALTH CARE EDUCATION/TRAINING PROGRAM

## 2022-10-18 PROCEDURE — 250N000011 HC RX IP 250 OP 636: Performed by: SURGERY

## 2022-10-18 PROCEDURE — 272N000001 HC OR GENERAL SUPPLY STERILE: Performed by: SURGERY

## 2022-10-18 PROCEDURE — 250N000011 HC RX IP 250 OP 636: Performed by: NURSE PRACTITIONER

## 2022-10-18 PROCEDURE — 710N000010 HC RECOVERY PHASE 1, LEVEL 2, PER MIN: Performed by: SURGERY

## 2022-10-18 RX ORDER — BUPIVACAINE HYDROCHLORIDE 2.5 MG/ML
INJECTION, SOLUTION EPIDURAL; INFILTRATION; INTRACAUDAL
Status: COMPLETED | OUTPATIENT
Start: 2022-10-18 | End: 2022-10-18

## 2022-10-18 RX ORDER — FENTANYL CITRATE 50 UG/ML
25 INJECTION, SOLUTION INTRAMUSCULAR; INTRAVENOUS EVERY 5 MIN PRN
Status: DISCONTINUED | OUTPATIENT
Start: 2022-10-18 | End: 2022-10-18 | Stop reason: HOSPADM

## 2022-10-18 RX ORDER — SODIUM CHLORIDE, SODIUM LACTATE, POTASSIUM CHLORIDE, CALCIUM CHLORIDE 600; 310; 30; 20 MG/100ML; MG/100ML; MG/100ML; MG/100ML
INJECTION, SOLUTION INTRAVENOUS CONTINUOUS
Status: DISCONTINUED | OUTPATIENT
Start: 2022-10-18 | End: 2022-10-18 | Stop reason: HOSPADM

## 2022-10-18 RX ORDER — DEXAMETHASONE SODIUM PHOSPHATE 4 MG/ML
INJECTION, SOLUTION INTRA-ARTICULAR; INTRALESIONAL; INTRAMUSCULAR; INTRAVENOUS; SOFT TISSUE PRN
Status: DISCONTINUED | OUTPATIENT
Start: 2022-10-18 | End: 2022-10-18

## 2022-10-18 RX ORDER — OXYCODONE HYDROCHLORIDE 5 MG/1
5 TABLET ORAL EVERY 4 HOURS PRN
Status: DISCONTINUED | OUTPATIENT
Start: 2022-10-18 | End: 2022-10-18

## 2022-10-18 RX ORDER — GLYCOPYRROLATE 0.2 MG/ML
INJECTION, SOLUTION INTRAMUSCULAR; INTRAVENOUS PRN
Status: DISCONTINUED | OUTPATIENT
Start: 2022-10-18 | End: 2022-10-18

## 2022-10-18 RX ORDER — AMOXICILLIN 250 MG
2 CAPSULE ORAL 2 TIMES DAILY
Status: DISCONTINUED | OUTPATIENT
Start: 2022-10-18 | End: 2022-10-21 | Stop reason: HOSPADM

## 2022-10-18 RX ORDER — NALOXONE HYDROCHLORIDE 0.4 MG/ML
0.2 INJECTION, SOLUTION INTRAMUSCULAR; INTRAVENOUS; SUBCUTANEOUS
Status: DISCONTINUED | OUTPATIENT
Start: 2022-10-18 | End: 2022-10-21 | Stop reason: HOSPADM

## 2022-10-18 RX ORDER — PROCHLORPERAZINE MALEATE 10 MG
10 TABLET ORAL EVERY 6 HOURS PRN
Status: DISCONTINUED | OUTPATIENT
Start: 2022-10-18 | End: 2022-10-21 | Stop reason: HOSPADM

## 2022-10-18 RX ORDER — ONDANSETRON 2 MG/ML
4 INJECTION INTRAMUSCULAR; INTRAVENOUS EVERY 30 MIN PRN
Status: DISCONTINUED | OUTPATIENT
Start: 2022-10-18 | End: 2022-10-18 | Stop reason: HOSPADM

## 2022-10-18 RX ORDER — NALOXONE HYDROCHLORIDE 0.4 MG/ML
0.4 INJECTION, SOLUTION INTRAMUSCULAR; INTRAVENOUS; SUBCUTANEOUS
Status: DISCONTINUED | OUTPATIENT
Start: 2022-10-18 | End: 2022-10-21 | Stop reason: HOSPADM

## 2022-10-18 RX ORDER — PROCHLORPERAZINE MALEATE 5 MG
10 TABLET ORAL EVERY 6 HOURS PRN
Status: DISCONTINUED | OUTPATIENT
Start: 2022-10-18 | End: 2022-10-18 | Stop reason: HOSPADM

## 2022-10-18 RX ORDER — ONDANSETRON 4 MG/1
4 TABLET, ORALLY DISINTEGRATING ORAL EVERY 6 HOURS PRN
Status: DISCONTINUED | OUTPATIENT
Start: 2022-10-18 | End: 2022-10-21 | Stop reason: HOSPADM

## 2022-10-18 RX ORDER — FLUMAZENIL 0.1 MG/ML
0.2 INJECTION, SOLUTION INTRAVENOUS
Status: DISCONTINUED | OUTPATIENT
Start: 2022-10-18 | End: 2022-10-18 | Stop reason: HOSPADM

## 2022-10-18 RX ORDER — FENTANYL CITRATE 50 UG/ML
25-50 INJECTION, SOLUTION INTRAMUSCULAR; INTRAVENOUS
Status: DISCONTINUED | OUTPATIENT
Start: 2022-10-18 | End: 2022-10-18 | Stop reason: HOSPADM

## 2022-10-18 RX ORDER — PROTAMINE SULFATE 10 MG/ML
50 INJECTION, SOLUTION INTRAVENOUS ONCE
Status: COMPLETED | OUTPATIENT
Start: 2022-10-18 | End: 2022-10-18

## 2022-10-18 RX ORDER — OXYCODONE HYDROCHLORIDE 5 MG/1
5-10 TABLET ORAL
Status: DISCONTINUED | OUTPATIENT
Start: 2022-10-18 | End: 2022-10-21

## 2022-10-18 RX ORDER — BISACODYL 10 MG
10 SUPPOSITORY, RECTAL RECTAL DAILY
Status: DISCONTINUED | OUTPATIENT
Start: 2022-10-19 | End: 2022-10-21 | Stop reason: HOSPADM

## 2022-10-18 RX ORDER — ONDANSETRON 2 MG/ML
4 INJECTION INTRAMUSCULAR; INTRAVENOUS EVERY 6 HOURS PRN
Status: DISCONTINUED | OUTPATIENT
Start: 2022-10-18 | End: 2022-10-21 | Stop reason: HOSPADM

## 2022-10-18 RX ORDER — ONDANSETRON 4 MG/1
4 TABLET, ORALLY DISINTEGRATING ORAL EVERY 6 HOURS PRN
Status: DISCONTINUED | OUTPATIENT
Start: 2022-10-18 | End: 2022-10-18 | Stop reason: HOSPADM

## 2022-10-18 RX ORDER — NALOXONE HYDROCHLORIDE 0.4 MG/ML
0.2 INJECTION, SOLUTION INTRAMUSCULAR; INTRAVENOUS; SUBCUTANEOUS
Status: DISCONTINUED | OUTPATIENT
Start: 2022-10-18 | End: 2022-10-18 | Stop reason: HOSPADM

## 2022-10-18 RX ORDER — DEXTROSE, SODIUM CHLORIDE, SODIUM LACTATE, POTASSIUM CHLORIDE, AND CALCIUM CHLORIDE 5; .6; .31; .03; .02 G/100ML; G/100ML; G/100ML; G/100ML; G/100ML
INJECTION, SOLUTION INTRAVENOUS CONTINUOUS
Status: DISCONTINUED | OUTPATIENT
Start: 2022-10-18 | End: 2022-10-19

## 2022-10-18 RX ORDER — FENTANYL CITRATE 50 UG/ML
INJECTION, SOLUTION INTRAMUSCULAR; INTRAVENOUS PRN
Status: DISCONTINUED | OUTPATIENT
Start: 2022-10-18 | End: 2022-10-18

## 2022-10-18 RX ORDER — ONDANSETRON 4 MG/1
4 TABLET, ORALLY DISINTEGRATING ORAL EVERY 30 MIN PRN
Status: DISCONTINUED | OUTPATIENT
Start: 2022-10-18 | End: 2022-10-18 | Stop reason: HOSPADM

## 2022-10-18 RX ORDER — ONDANSETRON 2 MG/ML
INJECTION INTRAMUSCULAR; INTRAVENOUS PRN
Status: DISCONTINUED | OUTPATIENT
Start: 2022-10-18 | End: 2022-10-18

## 2022-10-18 RX ORDER — SODIUM CHLORIDE, SODIUM LACTATE, POTASSIUM CHLORIDE, CALCIUM CHLORIDE 600; 310; 30; 20 MG/100ML; MG/100ML; MG/100ML; MG/100ML
1-3 INJECTION, SOLUTION INTRAVENOUS CONTINUOUS
Status: DISCONTINUED | OUTPATIENT
Start: 2022-10-18 | End: 2022-10-18 | Stop reason: HOSPADM

## 2022-10-18 RX ORDER — MAGNESIUM SULFATE HEPTAHYDRATE 40 MG/ML
2 INJECTION, SOLUTION INTRAVENOUS ONCE
Status: DISCONTINUED | OUTPATIENT
Start: 2022-10-18 | End: 2022-10-18

## 2022-10-18 RX ORDER — EPHEDRINE SULFATE 50 MG/ML
INJECTION, SOLUTION INTRAMUSCULAR; INTRAVENOUS; SUBCUTANEOUS PRN
Status: DISCONTINUED | OUTPATIENT
Start: 2022-10-18 | End: 2022-10-18

## 2022-10-18 RX ORDER — ONDANSETRON 2 MG/ML
4 INJECTION INTRAMUSCULAR; INTRAVENOUS ONCE
Status: DISCONTINUED | OUTPATIENT
Start: 2022-10-18 | End: 2022-10-20 | Stop reason: CLARIF

## 2022-10-18 RX ORDER — HYDROMORPHONE HCL IN WATER/PF 6 MG/30 ML
0.2 PATIENT CONTROLLED ANALGESIA SYRINGE INTRAVENOUS EVERY 5 MIN PRN
Status: DISCONTINUED | OUTPATIENT
Start: 2022-10-18 | End: 2022-10-18 | Stop reason: HOSPADM

## 2022-10-18 RX ORDER — HEPARIN SODIUM 1000 [USP'U]/ML
5000 INJECTION, SOLUTION INTRAVENOUS; SUBCUTANEOUS ONCE
Status: COMPLETED | OUTPATIENT
Start: 2022-10-18 | End: 2022-10-18

## 2022-10-18 RX ORDER — NALOXONE HYDROCHLORIDE 0.4 MG/ML
0.4 INJECTION, SOLUTION INTRAMUSCULAR; INTRAVENOUS; SUBCUTANEOUS
Status: DISCONTINUED | OUTPATIENT
Start: 2022-10-18 | End: 2022-10-18 | Stop reason: HOSPADM

## 2022-10-18 RX ORDER — SODIUM CHLORIDE, SODIUM LACTATE, POTASSIUM CHLORIDE, CALCIUM CHLORIDE 600; 310; 30; 20 MG/100ML; MG/100ML; MG/100ML; MG/100ML
INJECTION, SOLUTION INTRAVENOUS CONTINUOUS PRN
Status: DISCONTINUED | OUTPATIENT
Start: 2022-10-18 | End: 2022-10-18

## 2022-10-18 RX ORDER — PROPOFOL 10 MG/ML
INJECTION, EMULSION INTRAVENOUS PRN
Status: DISCONTINUED | OUTPATIENT
Start: 2022-10-18 | End: 2022-10-18

## 2022-10-18 RX ORDER — KETOROLAC TROMETHAMINE 30 MG/ML
15 INJECTION, SOLUTION INTRAMUSCULAR; INTRAVENOUS ONCE
Status: DISCONTINUED | OUTPATIENT
Start: 2022-10-18 | End: 2022-10-18 | Stop reason: HOSPADM

## 2022-10-18 RX ORDER — ACETAMINOPHEN 325 MG/1
975 TABLET ORAL ONCE
Status: DISCONTINUED | OUTPATIENT
Start: 2022-10-18 | End: 2022-10-18 | Stop reason: HOSPADM

## 2022-10-18 RX ORDER — CARDIOPLEG/ORGAN PRESERV NO.1 9-198-2-1
BOTTLE PERFUSION PRN
Status: DISCONTINUED | OUTPATIENT
Start: 2022-10-18 | End: 2022-10-18 | Stop reason: HOSPADM

## 2022-10-18 RX ORDER — GABAPENTIN 300 MG/1
300 CAPSULE ORAL ONCE
Status: COMPLETED | OUTPATIENT
Start: 2022-10-18 | End: 2022-10-18

## 2022-10-18 RX ORDER — MAGNESIUM SULFATE HEPTAHYDRATE 40 MG/ML
2 INJECTION, SOLUTION INTRAVENOUS ONCE
Status: COMPLETED | OUTPATIENT
Start: 2022-10-18 | End: 2022-10-18

## 2022-10-18 RX ORDER — ONDANSETRON 2 MG/ML
4 INJECTION INTRAMUSCULAR; INTRAVENOUS EVERY 6 HOURS PRN
Status: DISCONTINUED | OUTPATIENT
Start: 2022-10-18 | End: 2022-10-18 | Stop reason: HOSPADM

## 2022-10-18 RX ORDER — LIDOCAINE 40 MG/G
CREAM TOPICAL
Status: DISCONTINUED | OUTPATIENT
Start: 2022-10-18 | End: 2022-10-18 | Stop reason: HOSPADM

## 2022-10-18 RX ORDER — ONDANSETRON 2 MG/ML
4 INJECTION INTRAMUSCULAR; INTRAVENOUS ONCE
Status: DISCONTINUED | OUTPATIENT
Start: 2022-10-18 | End: 2022-10-18 | Stop reason: HOSPADM

## 2022-10-18 RX ORDER — CEFUROXIME SODIUM 1.5 G/16ML
1.5 INJECTION, POWDER, FOR SOLUTION INTRAVENOUS
Status: DISCONTINUED | OUTPATIENT
Start: 2022-10-18 | End: 2022-10-18 | Stop reason: HOSPADM

## 2022-10-18 RX ORDER — FENTANYL CITRATE 50 UG/ML
10-20 INJECTION, SOLUTION INTRAMUSCULAR; INTRAVENOUS
Status: DISCONTINUED | OUTPATIENT
Start: 2022-10-18 | End: 2022-10-19

## 2022-10-18 RX ORDER — AMOXICILLIN 250 MG
1 CAPSULE ORAL 2 TIMES DAILY
Status: DISCONTINUED | OUTPATIENT
Start: 2022-10-18 | End: 2022-10-21 | Stop reason: HOSPADM

## 2022-10-18 RX ORDER — FAMOTIDINE 20 MG/1
20 TABLET, FILM COATED ORAL DAILY
Status: DISCONTINUED | OUTPATIENT
Start: 2022-10-18 | End: 2022-10-21 | Stop reason: HOSPADM

## 2022-10-18 RX ORDER — ACETAMINOPHEN 325 MG/1
650 TABLET ORAL EVERY 6 HOURS
Status: COMPLETED | OUTPATIENT
Start: 2022-10-18 | End: 2022-10-20

## 2022-10-18 RX ORDER — MANNITOL 20 G/100ML
INJECTION, SOLUTION INTRAVENOUS PRN
Status: DISCONTINUED | OUTPATIENT
Start: 2022-10-18 | End: 2022-10-18

## 2022-10-18 RX ORDER — KETOROLAC TROMETHAMINE 15 MG/ML
10 INJECTION, SOLUTION INTRAMUSCULAR; INTRAVENOUS EVERY 8 HOURS
Status: COMPLETED | OUTPATIENT
Start: 2022-10-18 | End: 2022-10-19

## 2022-10-18 RX ORDER — CEFUROXIME SODIUM 1.5 G/16ML
1.5 INJECTION, POWDER, FOR SOLUTION INTRAVENOUS EVERY 4 HOURS PRN
Status: DISCONTINUED | OUTPATIENT
Start: 2022-10-18 | End: 2022-10-18 | Stop reason: HOSPADM

## 2022-10-18 RX ORDER — DEXAMETHASONE SODIUM PHOSPHATE 4 MG/ML
8 INJECTION, SOLUTION INTRA-ARTICULAR; INTRALESIONAL; INTRAMUSCULAR; INTRAVENOUS; SOFT TISSUE ONCE
Status: DISCONTINUED | OUTPATIENT
Start: 2022-10-18 | End: 2022-10-18 | Stop reason: HOSPADM

## 2022-10-18 RX ORDER — ACETAMINOPHEN 325 MG/1
975 TABLET ORAL ONCE
Status: COMPLETED | OUTPATIENT
Start: 2022-10-18 | End: 2022-10-18

## 2022-10-18 RX ORDER — LIDOCAINE HYDROCHLORIDE 20 MG/ML
INJECTION, SOLUTION INFILTRATION; PERINEURAL PRN
Status: DISCONTINUED | OUTPATIENT
Start: 2022-10-18 | End: 2022-10-18

## 2022-10-18 RX ADMIN — CEFUROXIME 1.5 G: 1.5 INJECTION, POWDER, FOR SOLUTION INTRAVENOUS at 10:56

## 2022-10-18 RX ADMIN — ACETAMINOPHEN 650 MG: 325 TABLET, FILM COATED ORAL at 21:44

## 2022-10-18 RX ADMIN — MANNITOL 12.5 G: 20 INJECTION, SOLUTION INTRAVENOUS at 09:43

## 2022-10-18 RX ADMIN — HYDROMORPHONE HYDROCHLORIDE 0.2 MG: 0.2 INJECTION, SOLUTION INTRAMUSCULAR; INTRAVENOUS; SUBCUTANEOUS at 13:30

## 2022-10-18 RX ADMIN — KETOROLAC TROMETHAMINE 10 MG: 15 INJECTION, SOLUTION INTRAMUSCULAR; INTRAVENOUS at 21:49

## 2022-10-18 RX ADMIN — SUGAMMADEX 200 MG: 100 INJECTION, SOLUTION INTRAVENOUS at 11:46

## 2022-10-18 RX ADMIN — GLYCOPYRROLATE 0.2 MG: 0.2 INJECTION, SOLUTION INTRAMUSCULAR; INTRAVENOUS at 07:42

## 2022-10-18 RX ADMIN — Medication 5 MG: at 08:53

## 2022-10-18 RX ADMIN — Medication 50 MG: at 06:52

## 2022-10-18 RX ADMIN — MAGNESIUM SULFATE HEPTAHYDRATE 2 G: 40 INJECTION, SOLUTION INTRAVENOUS at 07:27

## 2022-10-18 RX ADMIN — FENTANYL CITRATE 50 MCG: 50 INJECTION, SOLUTION INTRAMUSCULAR; INTRAVENOUS at 09:44

## 2022-10-18 RX ADMIN — ONDANSETRON 4 MG: 2 INJECTION INTRAMUSCULAR; INTRAVENOUS at 11:15

## 2022-10-18 RX ADMIN — FENTANYL CITRATE 50 MCG: 50 INJECTION, SOLUTION INTRAMUSCULAR; INTRAVENOUS at 07:34

## 2022-10-18 RX ADMIN — FENTANYL CITRATE 25 MCG: 0.05 INJECTION, SOLUTION INTRAMUSCULAR; INTRAVENOUS at 13:00

## 2022-10-18 RX ADMIN — HYDROMORPHONE HYDROCHLORIDE 0.2 MG: 0.2 INJECTION, SOLUTION INTRAMUSCULAR; INTRAVENOUS; SUBCUTANEOUS at 13:19

## 2022-10-18 RX ADMIN — ACETAMINOPHEN 975 MG: 325 TABLET, FILM COATED ORAL at 05:44

## 2022-10-18 RX ADMIN — HEPARIN SODIUM 5000 UNITS: 1000 INJECTION INTRAVENOUS; SUBCUTANEOUS at 10:42

## 2022-10-18 RX ADMIN — PROPOFOL 50 MG: 10 INJECTION, EMULSION INTRAVENOUS at 06:53

## 2022-10-18 RX ADMIN — BUPIVACAINE HYDROCHLORIDE 20 ML: 2.5 INJECTION, SOLUTION EPIDURAL; INFILTRATION; INTRACAUDAL; PERINEURAL at 07:05

## 2022-10-18 RX ADMIN — GABAPENTIN 300 MG: 300 CAPSULE ORAL at 05:44

## 2022-10-18 RX ADMIN — FENTANYL CITRATE 25 MCG: 0.05 INJECTION, SOLUTION INTRAMUSCULAR; INTRAVENOUS at 12:55

## 2022-10-18 RX ADMIN — FENTANYL CITRATE 25 MCG: 0.05 INJECTION, SOLUTION INTRAMUSCULAR; INTRAVENOUS at 13:05

## 2022-10-18 RX ADMIN — FENTANYL CITRATE 50 MCG: 50 INJECTION, SOLUTION INTRAMUSCULAR; INTRAVENOUS at 08:00

## 2022-10-18 RX ADMIN — LIDOCAINE HYDROCHLORIDE 100 MG: 20 INJECTION, SOLUTION INFILTRATION; PERINEURAL at 06:51

## 2022-10-18 RX ADMIN — FAMOTIDINE 20 MG: 20 TABLET ORAL at 15:00

## 2022-10-18 RX ADMIN — HYDROMORPHONE HYDROCHLORIDE 0.2 MG: 0.2 INJECTION, SOLUTION INTRAMUSCULAR; INTRAVENOUS; SUBCUTANEOUS at 13:38

## 2022-10-18 RX ADMIN — Medication 20 MG: at 09:11

## 2022-10-18 RX ADMIN — SODIUM CHLORIDE, POTASSIUM CHLORIDE, SODIUM LACTATE AND CALCIUM CHLORIDE 1 ML: 600; 310; 30; 20 INJECTION, SOLUTION INTRAVENOUS at 12:30

## 2022-10-18 RX ADMIN — SODIUM CHLORIDE, SODIUM LACTATE, POTASSIUM CHLORIDE, CALCIUM CHLORIDE AND DEXTROSE MONOHYDRATE: 5; 600; 310; 30; 20 INJECTION, SOLUTION INTRAVENOUS at 15:06

## 2022-10-18 RX ADMIN — FENTANYL CITRATE 50 MCG: 50 INJECTION, SOLUTION INTRAMUSCULAR; INTRAVENOUS at 06:50

## 2022-10-18 RX ADMIN — Medication 30 MG: at 07:33

## 2022-10-18 RX ADMIN — FENTANYL CITRATE 50 MCG: 50 INJECTION, SOLUTION INTRAMUSCULAR; INTRAVENOUS at 08:04

## 2022-10-18 RX ADMIN — Medication 10 MG: at 08:41

## 2022-10-18 RX ADMIN — HYDROMORPHONE HYDROCHLORIDE 0.2 MG: 0.2 INJECTION, SOLUTION INTRAMUSCULAR; INTRAVENOUS; SUBCUTANEOUS at 13:44

## 2022-10-18 RX ADMIN — PROTAMINE SULFATE 50 MG: 10 INJECTION, SOLUTION INTRAVENOUS at 10:45

## 2022-10-18 RX ADMIN — DEXAMETHASONE SODIUM PHOSPHATE 10 MG: 4 INJECTION, SOLUTION INTRA-ARTICULAR; INTRALESIONAL; INTRAMUSCULAR; INTRAVENOUS; SOFT TISSUE at 06:59

## 2022-10-18 RX ADMIN — FENTANYL CITRATE 50 MCG: 50 INJECTION, SOLUTION INTRAMUSCULAR; INTRAVENOUS at 11:11

## 2022-10-18 RX ADMIN — PROPOFOL 150 MG: 10 INJECTION, EMULSION INTRAVENOUS at 06:52

## 2022-10-18 RX ADMIN — Medication 10 MG: at 07:43

## 2022-10-18 RX ADMIN — SODIUM CHLORIDE, POTASSIUM CHLORIDE, SODIUM LACTATE AND CALCIUM CHLORIDE: 600; 310; 30; 20 INJECTION, SOLUTION INTRAVENOUS at 06:42

## 2022-10-18 RX ADMIN — FENTANYL CITRATE 25 MCG: 0.05 INJECTION, SOLUTION INTRAMUSCULAR; INTRAVENOUS at 12:49

## 2022-10-18 RX ADMIN — ACETAMINOPHEN 650 MG: 325 TABLET, FILM COATED ORAL at 15:00

## 2022-10-18 RX ADMIN — KETOROLAC TROMETHAMINE 10 MG: 15 INJECTION, SOLUTION INTRAMUSCULAR; INTRAVENOUS at 15:01

## 2022-10-18 RX ADMIN — GLYCOPYRROLATE 0.2 MG: 0.2 INJECTION, SOLUTION INTRAMUSCULAR; INTRAVENOUS at 07:44

## 2022-10-18 RX ADMIN — CEFUROXIME 1.5 G: 1.5 INJECTION, POWDER, FOR SOLUTION INTRAVENOUS at 06:57

## 2022-10-18 RX ADMIN — BUPIVACAINE 20 ML: 13.3 INJECTION, SUSPENSION, LIPOSOMAL INFILTRATION at 07:05

## 2022-10-18 RX ADMIN — HYDROMORPHONE HYDROCHLORIDE 0.2 MG: 0.2 INJECTION, SOLUTION INTRAMUSCULAR; INTRAVENOUS; SUBCUTANEOUS at 13:25

## 2022-10-18 ASSESSMENT — ACTIVITIES OF DAILY LIVING (ADL)
ADLS_ACUITY_SCORE: 35
ADLS_ACUITY_SCORE: 33
ADLS_ACUITY_SCORE: 35
ADLS_ACUITY_SCORE: 35
ADLS_ACUITY_SCORE: 33
ADLS_ACUITY_SCORE: 33

## 2022-10-18 NOTE — ANESTHESIA PROCEDURE NOTES
Airway       Patient location during procedure: OR       Procedure Start/Stop Times: 10/18/2022 6:54 AM  Staff -        Anesthesiologist:  Grace Watkins MD       Resident/Fellow: Agata Montoya MD       Performed By: resident  Consent for Airway        Urgency: elective  Indications and Patient Condition       Indications for airway management: jhonny-procedural       Induction type:intravenous       Mask difficulty assessment: 2 - vent by mask + OA or adjuvant +/- NMBA    Final Airway Details       Final airway type: endotracheal airway       Successful airway: ETT - single  Endotracheal Airway Details        ETT size (mm): 7.5       Cuffed: yes       Successful intubation technique: direct laryngoscopy       DL Blade Type: MAC 4       Grade View of Cords: 2       Adjucts: stylet       Position: Right       Measured from: lips       Secured at (cm): 23       Bite block used: Soft    Post intubation assessment        Placement verified by: capnometry, equal breath sounds and chest rise        Number of attempts at approach: 1       Secured with: pink tape       Ease of procedure: easy       Dentition: Intact    Medication(s) Administered   Medication Administration Time: 10/18/2022 6:54 AM

## 2022-10-18 NOTE — ANESTHESIA CARE TRANSFER NOTE
Patient: Jose Pimentel    Procedure: Procedure(s):  Laparoscopic Hand Assisted Left Kidney Living Unrelated Transplant Donor (Kidney Paired Exchange, Standard Voucher Donor, Recipient at another center)       Diagnosis: Encounter for donation of kidney [Z52.4]  Diagnosis Additional Information: No value filed.    Anesthesia Type:   General     Note:    Oropharynx: spontaneously breathing  Level of Consciousness: awake  Oxygen Supplementation: room air  Level of Supplemental Oxygen (L/min / FiO2): 4  Independent Airway: airway patency satisfactory and stable  Dentition: dentition unchanged  Vital Signs Stable: post-procedure vital signs reviewed and stable  Report to RN Given: handoff report given  Patient transferred to: PACU    Handoff Report: Identifed the Patient, Identified the Reponsible Provider, Reviewed the pertinent medical history, Discussed the surgical course, Reviewed Intra-OP anesthesia mangement and issues during anesthesia, Set expectations for post-procedure period and Allowed opportunity for questions and acknowledgement of understanding      Vitals:  Vitals Value Taken Time   /69    Temp 97.7    Pulse 79 10/18/22 1208   Resp 16 10/18/22 1208   SpO2 95 % 10/18/22 1208   Vitals shown include unvalidated device data.    Electronically Signed By: Agata Montoya MD  October 18, 2022  12:10 PM

## 2022-10-18 NOTE — ANESTHESIA PROCEDURE NOTES
TAP Procedure Note    Pre-Procedure   Staff -        Anesthesiologist:  Grace Watkins MD       Resident/Fellow: Agata Montoya MD       Performed By: resident       Location: OR       Procedure Start/Stop Times: 10/18/2022 6:55 AM and 10/18/2022 7:05 AM       Pre-Anesthestic Checklist: patient identified, IV checked, site marked, risks and benefits discussed, informed consent, monitors and equipment checked, pre-op evaluation, at physician/surgeon's request and post-op pain management  Timeout:       Correct Patient: Yes        Correct Procedure: Yes        Correct Site: Yes        Correct Position: Yes        Correct Laterality: Yes        Site Marked: Yes  Procedure Documentation  Procedure: TAP       Anesthesia laterality: Three quadrant TAP.       Patient Position: supine       Patient Prep/Sterile Barriers: sterile gloves, mask       Skin prep: Chloraprep       Needle Type: short bevel       Needle Gauge: 21.        Needle Length (millimeters): 110        Ultrasound guided       1. Ultrasound was used to identify targeted nerve, plexus, vascular marker, or fascial plane and place a needle adjacent to it in real-time.       2. Ultrasound was used to visualize the spread of anesthetic in close proximity to the above referenced structure.       3. A permanent image is entered into the patient's record.    Assessment/Narrative         The placement was negative for: blood aspirated, painful injection and site bleeding       Paresthesias: No.       Bolus given via needle..        Secured via.        Insertion/Infusion Method: Single Shot       Complications: none       Injection made incrementally with aspirations every 5 mL.    Medication(s) Administered   Bupivacaine 0.25% PF (Infiltration) - Infiltration   20 mL - 10/18/2022 7:05:00 AM  Bupivacaine liposome (Exparel) 1.3% LA inj susp (Infiltration) - Infiltration   20 mL - 10/18/2022 7:05:00 AM  Medication Administration Time: 10/18/2022 6:55  "AM      FOR [facility] ONLY:   Pain Team Contact information: please page the Pain Team Via [employer]. Search \"Pain\". During daytime hours, please page the attending first. At night please page the resident first.    "

## 2022-10-18 NOTE — OP NOTE
Transplant Surgery  Operative Note     Procedure Date:  10/18/22    Preoperative Diagnosis:  Healthy kidney donor    Postoperative Diagnosis:  Healthy kidney donor    Procedure:  1. Left laparoscopic hand-assisted living donor nephrectomy for donation       Secondary Procedure:        Surgeon:    Surgeon(s) and Role:     * Celeste Florian MD - Primary    Fellow/Assistant:    Baltazar Copeland, fellow assisted with entirety of dictated procedure. There was no qualified assistant to assist with this procedure.    Specimen:  Left kidney and ureter    Anesthesia:    General    Urine Output: 1075 mls  Estimated Blood Loss: 200 mls   Fluids administered:      Intra Op Events: as dictated     Complications: None.    Findings: two renal arteries, one vein, one ureter      None.        Donor UNOS ID:    [id number]  Flush Start time:  1052    Arterial Clamp:    1046  Arterial anatomy:  double    Venous anatomy:    single  Ureteral anatomy:   single     Indication: Jose Pimentel presents for left kidney donation. The patient has undergone a thorough medical and psychosocial evaluation and was found suitable for voluntary kidney donation. Risks and benefits of donation were discussed. The patient expressed understanding, was willing to proceed, and provided informed consent.    Final ABO/Crossmatch verification: Prior to incision, I verified the donor ABO and recipient ABO. I visually verified that the donor identification, blood type, and other vital data were compatible with the recipient.      Operative Procedure:  Jose Pimentel was transported to the operating room, placed on the operating table in the right lateral decubitus position, and a universal timeout was performed. Sequential compression devices were placed on both lower extremities and general endotracheal anesthesia was induced. The patient was given IV antibiotics and Vance catheter.  The abdomen was shaved, prepped, and draped in the usual sterile fashion.    We made  a 6 cm upper midline incision and carried down thru the linea alba. The peritoneum was opened under direct vision. The hand port was put into position and a left lower quadrant 10 mm port was placed over a trocar with hand assistance. Pneumoperitoneum with CO2 was provided to 12 mmHg. General survey with the laparoscope revealed no unusual findings. An additional 10 mm port was placed in the left lateral abdomen under direct vision.     We released the left colon from its lateral attachments and rotated medially, revealing the kidney and ureter. The ureter was circumferentially dissected free distally toward the pelvis then kidney taking care to preserve its vasculature. The gonadal vein was identified and dissected enbloc with the ureter, and the proximal gonadal vein was doubly ligated and divided near the insertion into the left renal vein. The left adrenal vein was likewise identified, ligated and divided. The renal vein was then circumferentially cleared of extraneous tissue. The kidney and ureter were dissected free posteriorly from the psoas and multiple lumbar veins were clipped and divided.     We created a plane between the left adrenal gland and the upper pole of the kidney with the harmonic scalpel and the upper pole attachments were released. The anterior and inferior aspects of the renal artery at its origin were cleared of investing lymphatics. The patient was given fluid, mannitol and lasix, and the kidney was then dissected free from its lateral attachments allowing full medial rotation. The remaining lymphatics and fat around the renal artery was cleared. The patient was heparinized and the distal ureter and gonadal vein were clipped and divided. Good urine flow was seen. A laparoscopic LUPE stapler was fired across one renal artery and then the renal vein. The superior renal artery taken with clips.    The kidney was delivered from the hand port, flushed, and placed in cold storage. Protamine was  administered for full heparin reversal. Pneumoperitoneum was reestablished and hemostasis was obtained. Vascular transection sites were visualized and confirmed secure. The colon was placed back in its natural position. The 10 mm port sites were closed with 0-vicryl. The hand port was closed with 0-PDS. Skin incisions were irrigated and closed with 4-0 monocryl and steris. Needle, sponge, and instrument counts were correct x2.  Faculty was present for key portions of the procedure. The patient tolerated the procedure well without apparent complications and was extubated and transferred to PACU in good condition.     I was present during the key portions of the procedure, and I was immediately available for the entire procedure. There was no qualified resident available to assist with the entire procedure. The fellow noted above participated as the first assistant in all parts of the dictated procedure and was the primary in the opening and closure with assistance from me as needed.

## 2022-10-18 NOTE — PROGRESS NOTES
Transplant Surgery Post-Op Check    S: Pain well controlled, denies nausea     O: Vital signs:  Temp: 99.5  F (37.5  C) Temp src: Axillary BP: 137/77 Pulse: 73       Gen: Well nourished male in no apparent distress  Skin: Warm, dry. Abdominal incisions covered with scant sanguinous shadowing on the dressing. Small abrasion to the center of his forehead.   CV: Well perfused.   Resp: Unlabored, adequate SpO2 saturation on room air  GI: Abdomen is round and soft. Surgical incisions as above.   : Adequate UOP in hooper  Neuro: Alert and oriented, moving all extremities     A/P: POD 0 s/p Left laparoscopic hand-assisted living donor nephrectomy for donation   - Advanced diet as tolerated  - OOB tonight  - Multimodal pain management   - Manage nausea as needed  - Monitor UOP, hooper to be removed in AM       Gita Jay NP

## 2022-10-18 NOTE — PHARMACY-CONSULT NOTE
D/I: 62 year old male s/p kidney donation surgery on 10/18/2022.  Medications have been reviewed by the pharmacist for efficacy, appropriate dose, medication interactions and potential adverse effects.      A: Medications reviewed for this patient as above. No medication issues were noted.  P: Pharmacy will continue to monitor for any potential medication issues, and will make recommendations as appropriate. Medication therapy needs for discharge planning will continue to be addressed throughout the current admission via multidisciplinary rounds and order review.    Pilar Gamez, Pharm.D., BCPS, [facility]  Pager [phone]

## 2022-10-18 NOTE — ANESTHESIA POSTPROCEDURE EVALUATION
Patient: Jose Pimentel    Procedure: Procedure(s):  Laparoscopic Hand Assisted Left Kidney Living Unrelated Transplant Donor (Kidney Paired Exchange, Standard Voucher Donor, Recipient at another center)       Anesthesia Type:  General    Note:  Disposition: Inpatient   Postop Pain Control: Uneventful            Sign Out: Well controlled pain   PONV: No   Neuro/Psych: Uneventful            Sign Out: Acceptable/Baseline neuro status   Airway/Respiratory: Uneventful            Sign Out: Acceptable/Baseline resp. status   CV/Hemodynamics: Uneventful            Sign Out: Acceptable CV status; No obvious hypovolemia; No obvious fluid overload   Other NRE: NONE   DID A NON-ROUTINE EVENT OCCUR? No           Last vitals:  Vitals Value Taken Time   /68 10/18/22 1345   Temp 36.6  C (97.9  F) 10/18/22 1345   Pulse 69 10/18/22 1352   Resp 11 10/18/22 1352   SpO2 99 % 10/18/22 1356   Vitals shown include unvalidated device data.    Electronically Signed By: Charles Madrigal MD  October 18, 2022  1:57 PM

## 2022-10-18 NOTE — INTERVAL H&P NOTE
"I have reviewed the surgical (or preoperative) H&P that is linked to this encounter, and examined the patient. There are no significant changes    Clinical Conditions Present on Arrival:  Clinically Significant Risk Factors Present on Admission                 # Obesity: Estimated body mass index is 30.53 kg/m  as calculated from the following:    Height as of this encounter: 1.905 m (6' 3\").    Weight as of this encounter: 110.8 kg (244 lb 4.3 oz).       "

## 2022-10-19 LAB
ALBUMIN UR-MCNC: NEGATIVE MG/DL
APPEARANCE UR: CLEAR
BILIRUB UR QL STRIP: NEGATIVE
BUN SERPL-MCNC: 20.6 MG/DL (ref 8–23)
CK SERPL-CCNC: 322 U/L (ref 39–308)
COLOR UR AUTO: ABNORMAL
CREAT SERPL-MCNC: 1.54 MG/DL (ref 0.67–1.17)
GFR SERPL CREATININE-BSD FRML MDRD: 51 ML/MIN/1.73M2
GLUCOSE UR STRIP-MCNC: NEGATIVE MG/DL
HCT VFR BLD AUTO: 39.2 % (ref 40–53)
HGB BLD-MCNC: 13.4 G/DL (ref 13.3–17.7)
HGB UR QL STRIP: NEGATIVE
HOLD SPECIMEN: NORMAL
KETONES UR STRIP-MCNC: NEGATIVE MG/DL
LEUKOCYTE ESTERASE UR QL STRIP: NEGATIVE
MUCOUS THREADS #/AREA URNS LPF: PRESENT /LPF
NITRATE UR QL: NEGATIVE
PH UR STRIP: 6 [PH] (ref 5–7)
RBC URINE: 0 /HPF
SP GR UR STRIP: 1.01 (ref 1–1.03)
UROBILINOGEN UR STRIP-MCNC: NORMAL MG/DL
WBC URINE: <1 /HPF

## 2022-10-19 PROCEDURE — 81001 URINALYSIS AUTO W/SCOPE: CPT | Performed by: SURGERY

## 2022-10-19 PROCEDURE — 120N000011 HC R&B TRANSPLANT UMMC

## 2022-10-19 PROCEDURE — 82550 ASSAY OF CK (CPK): CPT | Performed by: SURGERY

## 2022-10-19 PROCEDURE — 250N000013 HC RX MED GY IP 250 OP 250 PS 637: Performed by: SURGERY

## 2022-10-19 PROCEDURE — 82565 ASSAY OF CREATININE: CPT | Performed by: SURGERY

## 2022-10-19 PROCEDURE — 250N000011 HC RX IP 250 OP 636: Performed by: SURGERY

## 2022-10-19 PROCEDURE — 84520 ASSAY OF UREA NITROGEN: CPT | Performed by: SURGERY

## 2022-10-19 PROCEDURE — 85014 HEMATOCRIT: CPT | Performed by: SURGERY

## 2022-10-19 PROCEDURE — 36415 COLL VENOUS BLD VENIPUNCTURE: CPT | Performed by: SURGERY

## 2022-10-19 RX ADMIN — KETOROLAC TROMETHAMINE 10 MG: 15 INJECTION, SOLUTION INTRAMUSCULAR; INTRAVENOUS at 14:05

## 2022-10-19 RX ADMIN — FAMOTIDINE 20 MG: 20 TABLET ORAL at 08:06

## 2022-10-19 RX ADMIN — SENNOSIDES AND DOCUSATE SODIUM 1 TABLET: 50; 8.6 TABLET ORAL at 08:06

## 2022-10-19 RX ADMIN — OXYCODONE HYDROCHLORIDE 10 MG: 5 TABLET ORAL at 14:35

## 2022-10-19 RX ADMIN — ACETAMINOPHEN 650 MG: 325 TABLET, FILM COATED ORAL at 20:19

## 2022-10-19 RX ADMIN — KETOROLAC TROMETHAMINE 10 MG: 15 INJECTION, SOLUTION INTRAMUSCULAR; INTRAVENOUS at 22:28

## 2022-10-19 RX ADMIN — OXYCODONE HYDROCHLORIDE 10 MG: 5 TABLET ORAL at 11:17

## 2022-10-19 RX ADMIN — ACETAMINOPHEN 650 MG: 325 TABLET, FILM COATED ORAL at 08:06

## 2022-10-19 RX ADMIN — OXYCODONE HYDROCHLORIDE 5 MG: 5 TABLET ORAL at 08:05

## 2022-10-19 RX ADMIN — OXYCODONE HYDROCHLORIDE 5 MG: 5 TABLET ORAL at 09:17

## 2022-10-19 RX ADMIN — OXYCODONE HYDROCHLORIDE 5 MG: 5 TABLET ORAL at 17:44

## 2022-10-19 RX ADMIN — KETOROLAC TROMETHAMINE 10 MG: 15 INJECTION, SOLUTION INTRAMUSCULAR; INTRAVENOUS at 05:30

## 2022-10-19 RX ADMIN — SENNOSIDES AND DOCUSATE SODIUM 2 TABLET: 8.6; 5 TABLET ORAL at 20:19

## 2022-10-19 RX ADMIN — ACETAMINOPHEN 650 MG: 325 TABLET, FILM COATED ORAL at 14:05

## 2022-10-19 ASSESSMENT — ACTIVITIES OF DAILY LIVING (ADL)
ADLS_ACUITY_SCORE: 33

## 2022-10-19 NOTE — PROGRESS NOTES
Transplant Surgery  Inpatient Daily Progress Note  10/19/2022    Mr. Pimentel is Post-operative day #1 s/p laparoscopic left donor nephrectomy. Issues Overnight: Pain controlled, no nausea, drinking liquids     Patient Vitals for the past 24 hrs:   BP Temp Temp src Pulse Resp SpO2 Weight   10/19/22 0611 134/66 98.7  F (37.1  C) Oral 74 14 -- 112.3 kg (247 lb 9.6 oz)   10/19/22 0150 127/63 98.4  F (36.9  C) Oral 64 21 94 % --   10/18/22 2203 131/62 98.5  F (36.9  C) Oral 85 18 93 % --   10/18/22 1957 127/68 97.8  F (36.6  C) Oral 86 16 93 % --   10/18/22 1416 137/77 99.5  F (37.5  C) Axillary 73 14 96 % --   10/18/22 1345 -- 97.9  F (36.6  C) Oral 76 17 94 % --   10/18/22 1330 (!) 141/77 -- -- 70 (!) 35 96 % --   10/18/22 1315 136/77 -- -- 73 15 95 % --   10/18/22 1300 (!) 151/78 -- -- 79 16 98 % --   10/18/22 1245 (!) 150/80 -- -- 77 29 98 % --   10/18/22 1230 (!) 143/71 -- -- 75 18 97 % --   10/18/22 1220 -- -- -- 77 19 97 % --   10/18/22 1215 (!) 141/70 -- -- 74 20 95 % --   10/18/22 1210 -- -- -- 74 25 98 % --   10/18/22 1205 (!) 140/69 97.7  F (36.5  C) Oral 76 22 95 % --       Pain: Visual Analog Score: 5  Nausea:    [x]    None      []    Some, but not needing medication    []    Yes, needing medication      []    Dry Retching    []    Vomited    DREAMS Performance:    DRinking: yes   Eating: no   Analgesia: OK   Mobilizing:  yes   Slept: yes    Passed flatus? No  Incision(s): C/D/I with no jhonny-incisional ecchymoses  Abdomen: no distention, appropriately tender, soft  Extremities: no cyanosis or edema   Neuro: A&Ox4    Allergies:   Allergies   Allergen Reactions     Sulfa Drugs Hives       Medications:  Prescription Medications as of 10/19/2022       Rx Number Disp Refills Start End Last Dispensed Date Next Fill Date Owning Pharmacy    calcium citrate (CITRACAL) 950 (200 Ca) MG tablet            Sig: Take 1 tablet by mouth daily    Class: Historical    Route: Oral    Cholecalciferol 10 MCG (400 UNIT) CAPS             Sig: Take 400 Units by mouth daily    Class: Historical    Route: Oral    Flaxseed Oil (LINSEED OIL) OIL            Sig: Take 1 tablet by mouth daily    Class: Historical    Route: Oral    hydrochlorothiazide (HYDRODIURIL) 25 MG tablet    10/4/2021        Sig: Take 25 mg by mouth daily    Class: Historical    Route: Oral    mesalamine (LIALDA) 1.2 g DR tablet    7/20/2012        Sig: Take 300-600 mg by mouth daily    Class: Historical    Route: Oral    Multiple Vitamin (MULTI-VITAMINS) TABS            Sig: Take 1 tablet by mouth daily    Class: Historical    Route: Oral    omega 3 1200 MG CAPS            Sig: Take 1 capsule by mouth daily    Class: Historical    Route: Oral      Hospital Medications as of 10/19/2022       Dose Frequency Start End    acetaminophen (TYLENOL) tablet 650 mg 650 mg EVERY 6 HOURS 10/18/2022 10/20/2022    Admin Instructions: Administer for multimodal surgical pain management.  Pharmacy to time the next dose 8 hours from PRE OP dose.  Maximum acetaminophen dose from all sources = 75 mg/kg/day not to exceed 4 grams/day.    Class: E-Prescribe    Route: Oral    bisacodyl (DULCOLAX) suppository 10 mg 10 mg DAILY 10/19/2022     Admin Instructions: Start POD 1, then daily until BM. Hold for loose stools.  Hold for loose stools.    Class: E-Prescribe    Route: Rectal    dextrose 5% in lactated ringers infusion  CONTINUOUS 10/18/2022     Admin Instructions: Heplock when oral intake greater than 500 mL.    Class: E-Prescribe    Route: Intravenous    famotidine (PEPCID) tablet 20 mg 20 mg DAILY 10/18/2022     Class: E-Prescribe    Route: Oral    fentaNYL (PF) (SUBLIMAZE) injection 10-20 mcg 10-20 mcg EVERY 1 HOUR PRN 10/18/2022     Admin Instructions: For Severe breakthrough pain  Start at the lowest dose. May adjust dose by 5 mcg every 1 hour as needed. Maximum individual dose is 20 mcg.    Route: Intravenous    heparin (porcine) injection 5,000 Units (Completed) 5,000 Units ONCE 10/18/2022  10/18/2022    Admin Instructions: At direction of Surgeon, administer heparin intra-operatively 3 minutes before renal artery is clamped.    Class: E-Prescribe    Route: Intravenous    ketorolac (TORADOL) injection 10 mg 10 mg EVERY 8 HOURS 10/18/2022 10/20/2022    Admin Instructions: Pharmacy to time the first dose based on the timing of the INTRA-OP dose.  Can cause pain on injection.  If ordered intravenously (IV) : administer through a running maintenance fluid over 1 minute followed by a flush.  If patient complains of pain on injection, may dilute 15-30 mg in 5 mL and push over 1 to 2 minutes.      Class: E-Prescribe    Route: Intravenous    naloxone (NARCAN) injection 0.2 mg 0.2 mg EVERY 2 MIN PRN 10/18/2022     Admin Instructions: Administer intravenous route when available and notify provider when administered.  For unintended sedation or respiratory depression if all of the below criteria are met:  ~ respiratory rate LESS than or EQUAL to 8.   ~SaO2 less than 92% and or/end-tidal CO2 is greater than 50.  ~ the patient is receiving an opioid, has unintended sedations assessed as RASS (-3), and is currently not on mechanical ventilation.  RASS scale moderate (-3) is movement or eye opening to voice but no eye contact.    Patient Monitoring  Once the patient has demonstrated a response to the naloxone, continue to monitor respiratory rate, depth, oxygen saturation and end-tidal CO2 (if available) every 15 minutes x 2, then every 30 minutes x 2, then every 1 hour x 1 after each naloxone dose.  Consider transfer to ICU if patient respiratory parameters have not improved after 4 naloxone doses.    Class: E-Prescribe    Route: Intravenous    Linked Group 1: See [facility] for full Linked Orders Report.        naloxone (NARCAN) injection 0.2 mg 0.2 mg EVERY 2 MIN PRN 10/18/2022     Admin Instructions: Administer intramuscular if an intravenous route is not available and notify provider when administered.  For  unintended sedation or respiratory depression if all of the below criteria are met:  ~ respiratory rate LESS than or EQUAL to 8.   ~SaO2 less than 92% and or/end-tidal CO2 is greater than 50.  ~ the patient is receiving an opioid, has unintended sedations assessed as RASS (-3), and is currently not on mechanical ventilation.  RASS scale moderate (-3) is movement or eye opening to voice but no eye contact.    Patient Monitoring  Once the patient has demonstrated a response to the naloxone, continue to monitor respiratory rate, depth, oxygen saturation and end-tidal CO2 (if available) every 15 minutes x 2, then every 30 minutes x 2, then every 1 hour x 1 after each naloxone dose.  Consider transfer to ICU if patient respiratory parameters have not improved after 4 naloxone doses.    Class: E-Prescribe    Route: Intramuscular    Linked Group 1: [facility] for full Linked Orders Report.        naloxone (NARCAN) injection 0.4 mg 0.4 mg EVERY 2 MIN PRN 10/18/2022     Admin Instructions: Administer intravenous route when available and notify provider when administered.  For unintended sedation or respiratory depression if all of the below criteria are met:  ~ respiratory rate LESS than or EQUAL to 8.  ~ SaO2 less than 92% and or/end-tidal CO2 is greater than 50.  ~ the patient is receiving an opioid, has unintended sedation assessed as RASS (-4) or (-5) and patient is currently not on mechanical ventilation.  RASS scale (-4) is deep sedation with no response to voice but movement or eye opening to physical stimulation.  RASS scale (-5) is unarousable.      Patient Monitoring  Once the patient has demonstrated a response to the naloxone, continue to monitor respiratory rate, depth, oxygen saturation and end-tidal CO2 (if available) every 15 minutes x 2, then every 30 minutes x 2, then every 1 hour x 1 after each naloxone dose.  Consider transfer to ICU if patient respiratory parameters have not improved after 4 naloxone  doses.    Class: E-Prescribe    Route: Intravenous    Linked Group 1: See [facility] for full Linked Orders Report.        naloxone (NARCAN) injection 0.4 mg 0.4 mg EVERY 2 MIN PRN 10/18/2022     Admin Instructions: Administer intramuscular if an intravenous route is not available and notify provider when administered.  For unintended sedation or respiratory depression if all of the below criteria are met:  ~ respiratory rate LESS than or EQUAL to 8.  ~ SaO2 less than 92% and or/end-tidal CO2 is greater than 50.  ~ the patient is receiving an opioid, has unintended sedation assessed as RASS (-4) or (-5) and patient is currently not on mechanical ventilation.  RASS scale (-4) is deep sedation with no response to voice but movement or eye opening to physical stimulation.  RASS scale (-5) is unarousable.      Patient Monitoring  Once the patient has demonstrated a response to the naloxone, continue to monitor respiratory rate, depth, oxygen saturation and end-tidal CO2 (if available) every 15 minutes x 2, then every 30 minutes x 2, then every 1 hour x 1 after each naloxone dose.  Consider transfer to ICU if patient respiratory parameters have not improved after 4 naloxone doses.    Class: E-Prescribe    Route: Intramuscular    Linked Group 1: See [facility] for full Linked Orders Report.        ondansetron (ZOFRAN ODT) ODT tab 4 mg 4 mg EVERY 6 HOURS PRN 10/18/2022     Admin Instructions: This is Step 1 of nausea and vomiting management.  If nausea not resolved in 15 minutes, go to Step 2 prochlorperazine (COMPAZINE). Do not push through foil backing. Peel back foil and gently remove. Place on tongue immediately. Administration with liquid unnecessary  With dry hands, peel back foil backing and gently remove tablet. Do not push oral disintegrating tablet through foil backing. Administer immediately on tongue and oral disintegrating tablet dissolves in seconds, then swallow with saliva. Liquid not required.    Class:  E-Prescribe    Route: Oral    Linked Group 2: See [facility] for full Linked Orders Report.        ondansetron (ZOFRAN) injection 4 mg 4 mg ONCE 10/18/2022     Admin Instructions: Administer at the END of surgery.  Irritant.    Class: E-Prescribe    Route: Intravenous    ondansetron (ZOFRAN) injection 4 mg 4 mg EVERY 6 HOURS PRN 10/18/2022     Admin Instructions: This is Step 1 of nausea and vomiting management.  If nausea not resolved in 15 minutes, go to Step 2 prochlorperazine (COMPAZINE).  Irritant.    Class: E-Prescribe    Route: Intravenous    Linked Group 2: See [facility] for full Linked Orders Report.        oxyCODONE (ROXICODONE) tablet 5-10 mg 5-10 mg EVERY 3 HOURS PRN 10/18/2022     Admin Instructions: ~ Start at the lowest dose.  ~ May adjust dose by 5 mg every 3 hours to optimize patient mobilization and comfort as needed.  Maximum individual dose is 10 mg.  ~ Hold dose for analgesic side effects.    ~ Notify provider to assess patient for uncontrolled pain or analgesic side effects.   ~ Maximum total is 80 mg in 24 hours.    Route: Oral    prochlorperazine (COMPAZINE) injection 10 mg 10 mg EVERY 6 HOURS PRN 10/18/2022     Admin Instructions: This is Step 2 of nausea and vomiting management.   If nausea not resolved in 15-30 minutes, Notify provider.    Class: E-Prescribe    Route: Intravenous    Linked Group 3: See [facility] for full Linked Orders Report.        prochlorperazine (COMPAZINE) tablet 10 mg 10 mg EVERY 6 HOURS PRN 10/18/2022     Admin Instructions: This is Step 2 of nausea and vomiting management.   If nausea not resolved in 15-30 minutes, Notify provider.    Class: E-Prescribe    Route: Oral    Linked Group 3: See [facility] for full Linked Orders Report.        protamine injection 50 mg (Completed) 50 mg ONCE 10/18/2022 10/18/2022    Admin Instructions: At direction of Surgeon, administer protamine intra-operatively.  Give slowly once renal artery is divided.   1 ml protamine (10  mg/mL) for every 1 ml heparin (1000 units/mL)    Class: E-Prescribe    Route: Intravenous    senna-docusate (SENOKOT-S/PERICOLACE) 8.6-50 MG per tablet 1 tablet 1 tablet 2 TIMES DAILY 10/18/2022     Admin Instructions: If no bowel movement in 24 hours, increase to 2 tablets PO.  Hold for loose stools.  Hold for loose stools.    Class: E-Prescribe    Route: Oral    Linked Group 4: See [facility] for full Linked Orders Report.        senna-docusate (SENOKOT-S/PERICOLACE) 8.6-50 MG per tablet 2 tablet 2 tablet 2 TIMES DAILY 10/18/2022     Admin Instructions: Hold for loose stools.  Hold for loose stools.    Class: E-Prescribe    Route: Oral    Linked Group 4: See [facility] for full Linked Orders Report.              Labs:  Lab Results   Component Value Date    CR 1.54 (H) 10/19/2022    CR 0.87 10/18/2022    CR 0.93 10/17/2022     Lab Results   Component Value Date    HGB 13.4 10/19/2022    HGB 14.7 10/18/2022    HGB 16.2 10/17/2022       Assessment: Post-operative day #1, doing fairly well.    Plan:   -Encouraged ambulation and ISB   -Continue GI and DVT prophylaxis  -Pain control: Scheduled acetaminophen. Encourage oral analgesia prn.  -Remove hooper  -Discontinue IV fluid    Discharge planning: Today vs tomorrow. Will check progress this afternoon.    GREGORIO Stoner CNP  Division of Transplantation  Pager [phone]

## 2022-10-19 NOTE — PLAN OF CARE
"/77 (BP Location: Left arm)   Pulse 73   Temp 99.5  F (37.5  C) (Axillary)   Resp 14   Ht 1.905 m (6' 3\")   Wt 110.8 kg (244 lb 4.3 oz)   SpO2 96%   BMI 30.53 kg/m    Transferred from PACU to unit 7A following kidney donation.  Patient VSS on RA + capno, afebrile. Abdominal incision pain managed with scheduled meds. No complaints of nausea. Tolerating regular diet with fair appetite. PIV saline locked. PIV running D5LR at 60 ml/hr. Great UOP via hooper catheter. No BM. Page sent regarding patients home medications, awaiting response. Spouse present at bedside.   Will continue with POC and notify MD with changes or concerns.      "

## 2022-10-19 NOTE — PLAN OF CARE
Goal Outcome Evaluation:      Plan of Care Reviewed With: patient, spouse    Overall Patient Progress: improvingOverall Patient Progress: improving         Neuro: Alert and oriented X 4. C  Cardiac: WDL  Respiratory: Lungs clear. Pt on RA.  GI/: Voiding spontaneously in good amounts post hooper removal.   Diet/appetite: Tolerating regular diet. Denies nausea.  Activity:  SBA.  Pain: Controlled with oxycodone, toradol, and tylenol.  Skin: Dressing intact to abdomen. Abdominal binder in place.   LDA's:  R&L hand PIV SL'd.     Plan: Continue to monitor.

## 2022-10-19 NOTE — PLAN OF CARE
Goal Outcome Evaluation:  Removed hooper cath without issue. Explained to him about calling for assist the first 2x's he needs to void so as to have a standby assist as well as a bladder scan after voiding. Stated that he understood. Discontinued MIV fluids due to adequate water intake per mouth as well as more than adequate urine output. Also CAPNO was discontinued since Jose is  wide awake satting 94-95% on RA throughout the night. Explained to him how to order breakfast per hospital phone and he stated that he understood. Will continue to monitor and report any significant changes.

## 2022-10-19 NOTE — PLAN OF CARE
Shift: 3-7p  Isolation Status: n/a  VS: stable  Neuro: alert, oriented  Behaviors: calm, cooperative  Respiratory: WDL  Cardiac: WDL  Pain/Nausea: abdomen, incisional  PRN: oxy q3,   Diet: reg  GI/: +voids, passing gas, -bm  Skin: incision, abd binder  Mobility: up ad alida  Plan: discharge tomorrow    Problem: Plan of Care - These are the overarching goals to be used throughout the patient stay.    Goal: Plan of Care Review  Description: The Plan of Care Review/Shift note should be completed every shift.  The Outcome Evaluation is a brief statement about your assessment that the patient is improving, declining, or no change.  This information will be displayed automatically on your shift note.  Outcome: Progressing  Flowsheets (Taken 10/19/2022 4466)  Plan of Care Reviewed With: patient  Overall Patient Progress: improving   Goal Outcome Evaluation:      Plan of Care Reviewed With: patient    Overall Patient Progress: improvingOverall Patient Progress: improving

## 2022-10-19 NOTE — PROGRESS NOTES
transferred from: PACU  2 RN full   skin assessment completed by Loan Smith, RN and Vidhi Asencio RN.  Skin assessment finding: issues found abdominal incision, hooper catheter, hernia   Interventions/actions: other NA   Will continue to monitor.

## 2022-10-19 NOTE — PROGRESS NOTES
INDEPENDENT LIVING DONOR ADVOCATE NOTE:  D:  Jose iPmentel is a living kidney donor, POD #1.  I:  I met with him at bedside to thank him for his donation and to assess for any MARILYN needs.  I assessed the patient's mood/affect, plans for recovery, and any feelings of regret/remorse regarding donation.  We reviewed the importance of completing follow-up labs and surveys at six months, 1 year and 2 years after donation to monitor kidney health and the impact donation has had on their life post donation.   A:  Jose is resting comfortably/sitting up/walking the unit.  He appears to be in a his mood and affect is congruent.  He states that pain is well controlled.   Jose describes donation recovery as very well so far .  He and I discussed how to reach me should he have any post operative MARILYN needs.  Bret reports no feelings of regret or remorse about donation.  He denies any discharge planning needs at this time.  Patient plans to discharge to a local hot with his wife, and then will be traveling back to [place] with the care and support of his family.   P: MARILYN will remain available to assist with any support and MARILYN needs, both inpatient and outpatient, as needed.  Patient is aware of follow-up recommendations and has my contact information.    Denise Lomeli, RAMYA, [facility]  Independent Living Donor Advocate  [facility], [facility], [facility]  Pager:  [phone]  Direct:  [phone] Cell Phone  E-Mail:  [email or web address]

## 2022-10-19 NOTE — PROGRESS NOTES
"SPIRITUAL HEALTH SERVICES Progress Note  [facility] 7a    Saw pt Jose Pimentel per admission request.    Illness Narrative - donation of kidney    Distress - Pt did not name a specific distress. Pt and pt's wife, Jacquelyn, stated they were feeling \"good\" about how the surgery went.    Coping - Pt and spouse said that bakari is important to them. Both said they pray.    Meaning-Making - Pt is a  and said \"god spoke to me\" about donating his kidney for his friend. Pt and spouse \"cried tears of yonathan\" when they read an article talking about the story of his donating his kidney that was published    Plan - No follow up plan at this time    Marciano Michele MDiv  Chaplain Resident  Pager [phone]    * [facility] remains available 24/7 for emergent requests/referrals, either by having the switchboard page the on-call  or by entering an ASAP/STAT consult in Epic (this will also page the on-call ). Routine Epic consults receive an initial response within 24 hours.*   "

## 2022-10-19 NOTE — PHARMACY-ADMISSION MEDICATION HISTORY
Admission Medication History Completed by Pharmacy    See [facility] Admission Navigator for allergy information, preferred outpatient pharmacy, prior to admission medications and immunization status.     Medication History Sources:     Patient    Changes made to PTA medication list (reason):    Added: None    Deleted: None    Changed:   o Lialda - changed from 1200 mg to 300-600 mg daily, per patient report.   o Added missing instructions to supplement entries    Additional Information:  - Lialda - Pt states that he doesn't need the full dose, so he breaks his tablets in halves or quarters. I was unable to determine which exact brand he receives - these are not filled at his usual pharmacy and he stated that he doesn't take brand name Lialda. I advised the patient to follow up with his prescriber and look into whether these tablets can be broken since most mesalamine products are supposed to be swallowed whole.  - Patient reports that he stopped taking all of his herbal/vitamin supplements one week ago.  - Home pharmacy is [facility] in [place], [place].      Prior to Admission medications    Medication Sig Last Dose Taking? Auth Provider Long Term End Date   calcium citrate (CITRACAL) 950 (200 Ca) MG tablet Take 1 tablet by mouth daily Past Week Yes Reported, Patient     Cholecalciferol 10 MCG (400 UNIT) CAPS Take 400 Units by mouth daily Past Week Yes Reported, Patient     Flaxseed Oil (LINSEED OIL) OIL Take 1 tablet by mouth daily Past Week Yes Reported, Patient     hydrochlorothiazide (HYDRODIURIL) 25 MG tablet Take 25 mg by mouth daily 10/17/2022 at 2000 Yes Reported, Patient Yes    mesalamine (LIALDA) 1.2 g DR tablet Take 300-600 mg by mouth daily 10/17/2022 at 2000 Yes Reported, Patient     Multiple Vitamin (MULTI-VITAMINS) TABS Take 1 tablet by mouth daily Past Week Yes Reported, Patient     omega 3 1200 MG CAPS Take 1 capsule by mouth daily Past Week Yes Reported, Patient         Date completed:  10/18/22    Medication history completed by:   Saloni Albert, PharmD

## 2022-10-19 NOTE — CONSULTS
Transplant Admission Psychosocial Assessment    Patient Name: Jose Pimentel  : 1960  Age: 62 year old  MRN: [id number]  Date of Initial Social Work Evaluation: 22     LIVING DONOR SOCIAL WORK NOTE:  D: Jose is a living kidney donor, POD 1.  I:  I met with him at bedside to thank him for his donation and to assess for any psychosocial needs and to assist with discharge planning.  I assessed the patient's mood/affect, plans for recovery, and any feelings of regret or remorse regarding donation.   A:  Jose is resting in the bedside chair. He appears to be in a great mood and affect is congruent. He states that pain is well controlled.Patient describes donation recovery as smooth so far. Jose and I discussed how to reach me should he have any post operative psychosocial needs. He reports no feelings of regret or remorse about donation.  He denies any discharge planning needs at this time.  Patient plans to discharge to a local hot with his wife, Jacquelyn, until cleared to go home to [place].   P: Donor  will remain available to assist with any psychosocial needs, both inpatient and outpatient, as needed.  Patient is aware of follow-up recommendations and has my contact information.        Presenting Information   Living Situation: lives with wife Jacquelyn in [place]  If not local, plans for short term stay:  Staying at hotel in [place]   Previous Functional Status: independent   Cultural/Language/Spiritual Considerations: none discussed     Support System  Primary Support Person Wife  Other support:  Adult children   Plan for support in immediate post-transplant period: hotel near hospital with wife     Health Care Directive  Decision Maker: wife   Alternate Decision Maker: adult children   Health Care Directive: Provided education    Mental Health/Coping:   History of Mental Health: Denies  History of Chemical Health: Denies   Current status: appropriate   Coping: coping well   Services  Needed/Recommended: none     Financial   Income: Jose is a  and is getting [facility] travel assistance and [employer] wage reimbursement   Impact of transplant on income: none   Insurance and medication coverage: n/a  Financial concerns: none  Resources needed: none     Assessment and recommendations and plan:  SW to remain available as needed.     Jessica Dominguez [facility]astic[facility] HospitalSW, CCTSW   Living Donor   [facility], [facility]  Direct: [phone]  E-Mail: [email or web address]

## 2022-10-19 NOTE — PLAN OF CARE
"  Problem: Plan of Care - These are the overarching goals to be used throughout the patient stay.    Goal: Optimal Comfort and Wellbeing  Outcome: Progressing     Problem: Pain Acute  Goal: Optimal Pain Control and Function  Outcome: Progressing     Neuro: Alert and oriented X 4. Call light appropriate.     Cardiac: /63 (BP Location: Right arm)   Pulse 64   Temp 98.4  F (36.9  C) (Oral)   Resp 21   Ht 1.905 m (6' 3\")   Wt 110.8 kg (244 lb 4.3 oz)   SpO2 94%.    Respiratory: On Capno. No respiratory distress.     GI/: Vance patent. Bowel sound +. No BM tonight. Urine sample sent to lab.     Diet/appetite: Regular diet. Denies nausea.     Activity:  SBA of 1.     Pain: Toradol and Tylenol given. Refused morning tylenol and suppository. Catheter to be removed this morning.     Skin: dressing intact to abdomen. Abdominal binder in place.     LDA's:  R&L hand PIV. Fluid infusing.     Plan: To monitor.   "

## 2022-10-20 ENCOUNTER — APPOINTMENT (OUTPATIENT)
Dept: GENERAL RADIOLOGY | Facility: CLINIC | Age: 62
DRG: 660 | End: 2022-10-20
Attending: NURSE PRACTITIONER
Payer: COMMERCIAL

## 2022-10-20 PROBLEM — R50.82 FEVER POSTOP: Status: ACTIVE | Noted: 2022-10-20

## 2022-10-20 PROBLEM — G89.18 ACUTE POST-OPERATIVE PAIN: Status: ACTIVE | Noted: 2022-10-20

## 2022-10-20 LAB
ANION GAP SERPL CALCULATED.3IONS-SCNC: 8 MMOL/L (ref 7–15)
BUN SERPL-MCNC: 20.2 MG/DL (ref 8–23)
CALCIUM SERPL-MCNC: 8.9 MG/DL (ref 8.8–10.2)
CHLORIDE SERPL-SCNC: 104 MMOL/L (ref 98–107)
CREAT SERPL-MCNC: 1.7 MG/DL (ref 0.67–1.17)
DEPRECATED HCO3 PLAS-SCNC: 24 MMOL/L (ref 22–29)
ERYTHROCYTE [DISTWIDTH] IN BLOOD BY AUTOMATED COUNT: 12.9 % (ref 10–15)
GFR SERPL CREATININE-BSD FRML MDRD: 45 ML/MIN/1.73M2
GLUCOSE SERPL-MCNC: 108 MG/DL (ref 70–99)
HCT VFR BLD AUTO: 39.8 % (ref 40–53)
HGB BLD-MCNC: 13.5 G/DL (ref 13.3–17.7)
MCH RBC QN AUTO: 30.1 PG (ref 26.5–33)
MCHC RBC AUTO-ENTMCNC: 33.9 G/DL (ref 31.5–36.5)
MCV RBC AUTO: 89 FL (ref 78–100)
PLATELET # BLD AUTO: 150 10E3/UL (ref 150–450)
POTASSIUM SERPL-SCNC: 4.2 MMOL/L (ref 3.4–5.3)
RBC # BLD AUTO: 4.48 10E6/UL (ref 4.4–5.9)
SODIUM SERPL-SCNC: 136 MMOL/L (ref 136–145)
WBC # BLD AUTO: 11.8 10E3/UL (ref 4–11)

## 2022-10-20 PROCEDURE — 250N000013 HC RX MED GY IP 250 OP 250 PS 637: Performed by: SURGERY

## 2022-10-20 PROCEDURE — 71046 X-RAY EXAM CHEST 2 VIEWS: CPT

## 2022-10-20 PROCEDURE — 71046 X-RAY EXAM CHEST 2 VIEWS: CPT | Mod: 26 | Performed by: RADIOLOGY

## 2022-10-20 PROCEDURE — 85048 AUTOMATED LEUKOCYTE COUNT: CPT | Performed by: NURSE PRACTITIONER

## 2022-10-20 PROCEDURE — 120N000011 HC R&B TRANSPLANT UMMC

## 2022-10-20 PROCEDURE — 36415 COLL VENOUS BLD VENIPUNCTURE: CPT | Performed by: NURSE PRACTITIONER

## 2022-10-20 PROCEDURE — 85014 HEMATOCRIT: CPT | Performed by: NURSE PRACTITIONER

## 2022-10-20 PROCEDURE — 82310 ASSAY OF CALCIUM: CPT | Performed by: NURSE PRACTITIONER

## 2022-10-20 PROCEDURE — 99024 POSTOP FOLLOW-UP VISIT: CPT | Performed by: SURGERY

## 2022-10-20 PROCEDURE — 250N000013 HC RX MED GY IP 250 OP 250 PS 637: Performed by: NURSE PRACTITIONER

## 2022-10-20 RX ORDER — MESALAMINE 1.2 G/1
1.2 TABLET, DELAYED RELEASE ORAL
Status: DISCONTINUED | OUTPATIENT
Start: 2022-10-20 | End: 2022-10-21 | Stop reason: HOSPADM

## 2022-10-20 RX ADMIN — ACETAMINOPHEN 650 MG: 325 TABLET, FILM COATED ORAL at 01:46

## 2022-10-20 RX ADMIN — OXYCODONE HYDROCHLORIDE 5 MG: 5 TABLET ORAL at 19:39

## 2022-10-20 RX ADMIN — FAMOTIDINE 20 MG: 20 TABLET ORAL at 09:08

## 2022-10-20 RX ADMIN — ACETAMINOPHEN 650 MG: 325 TABLET, FILM COATED ORAL at 09:08

## 2022-10-20 RX ADMIN — BISACODYL 10 MG: 10 SUPPOSITORY RECTAL at 15:49

## 2022-10-20 RX ADMIN — SENNOSIDES AND DOCUSATE SODIUM 2 TABLET: 8.6; 5 TABLET ORAL at 19:38

## 2022-10-20 RX ADMIN — MESALAMINE 1.2 G: 1.2 TABLET, DELAYED RELEASE ORAL at 15:17

## 2022-10-20 RX ADMIN — OXYCODONE HYDROCHLORIDE 10 MG: 5 TABLET ORAL at 11:35

## 2022-10-20 RX ADMIN — OXYCODONE HYDROCHLORIDE 5 MG: 5 TABLET ORAL at 09:07

## 2022-10-20 RX ADMIN — OXYCODONE HYDROCHLORIDE 5 MG: 5 TABLET ORAL at 05:54

## 2022-10-20 RX ADMIN — SENNOSIDES AND DOCUSATE SODIUM 2 TABLET: 8.6; 5 TABLET ORAL at 09:08

## 2022-10-20 RX ADMIN — OXYCODONE HYDROCHLORIDE 5 MG: 5 TABLET ORAL at 15:49

## 2022-10-20 RX ADMIN — ACETAMINOPHEN 650 MG: 325 TABLET, FILM COATED ORAL at 15:17

## 2022-10-20 ASSESSMENT — ACTIVITIES OF DAILY LIVING (ADL)
ADLS_ACUITY_SCORE: 33

## 2022-10-20 NOTE — PROVIDER NOTIFICATION
Notified MD at 2203 PM regarding temperature of 102.1.      Spoke with: page sent to [phone]    Orders call back pending. .    Comments: NA

## 2022-10-20 NOTE — PROVIDER NOTIFICATION
7210-2 christian goetz.   fever 100.1 oral. rechekc 99.6.   [id number] 7a. RN    On call called back stating page them if fever greater or equal to 100.4 celsius.

## 2022-10-20 NOTE — PROGRESS NOTES
Transplant Surgery Brief Note (Overnight)    PATIENT NAME: Jose Pimentel  MRN: [id number]  DATE: 10/19/2022 10:25 PM  LAST PROCEDURE: 10/18/2022: Laparoscopic Hand Assisted Left Kidney Living Unrelated Transplant Donor (Kidney Paired Exchange, Standard Voucher Donor, Recipient at another center):      Paged regarding fever of 102.1F. Patient is on POD1.  On bedside assessment, Jose was comfortably laying on the bed. He said he was told he had a fever, but felt completely fine. He did note the presence of mild post operative pain, but he was not tender to palpation and was freely mobile. His room was notable for being slightly warm.     ROS: Denied subjective feelings of fevers or chills. Denied shortness of breath/increased WOB, chest pain, nausea, vomiting, diarrhea.     PE: Incisional sites were unremarkable and there was no erythema or edema notable. Lungs were CTAB and patient's tidal volumes on respiration/inspiration were excellent. The patient has no hooper in place and reports being freely able to urinate.    Vital Signs: Most Recent  Ranges (24 hours)   Temp: (!) 102.1  F (38.9  C)  Pulse: 79  BP: 115/68  Resp: 16  SpO2: 91 % Temp  Min: 98.3  F (36.8  C)  Max: 102.1  F (38.9  C)  Pulse  Min: 64  Max: 79  BP  Min: 107/81  Max: 135/68  Resp  Min: 14  Max: 21  SpO2  Min: 91 %  Max: 95 %   O2 Device: None (Room air)     Recent Labs   Lab 10/19/22  0006 10/18/22  1252 10/17/22  1010   WBC  --  14.9*  --    HGB 13.4 14.7 16.2   PLT  --  170  --      Plan:  #Post-Operative Fever: Likely 2/2 inflammatory response from the operation  - Continue supportive care with scheduled APAP.  - Obtain routine CBC in AM  - Page MD on call if new signs or symptoms of infection occur.    Gabo Chandra MD   General Surgery Resident

## 2022-10-20 NOTE — PLAN OF CARE
"/64   Pulse 82   Temp 98.6  F (37  C)   Resp 16   Ht 1.905 m (6' 3\")   Wt 112.8 kg (248 lb 9.6 oz)   SpO2 93%   BMI 31.07 kg/m    [id number]  Patient VSS on RA, afebrile. Abdominal incision pain managed with scheduled meds and PRN oxycodone x2. Tolerating regular diet with good appetite. PIV x2 saline locked. Voiding spontaneously with good UOP. No BM, passing flatus.  Up independently. Chest xray completed. Plan for potential discharge tomorrow. Will continue with POC and notify MD with changes or concerns.      "

## 2022-10-20 NOTE — PLAN OF CARE
"  Problem: Plan of Care - These are the overarching goals to be used throughout the patient stay.    Goal: Optimal Comfort and Wellbeing  Outcome: Progressing     Problem: Pain Acute  Goal: Optimal Pain Control and Function  Outcome: Progressing    MD assessed this patient due to elevated temperature of 102.1.  No new order. Pt is to be monitored.     Neuro: Alert and oriented X 4. Call light appropriate.     Cardiac: /68 (BP Location: Right arm)   Pulse 79   Temp 100.1  F (37.8  C) (Oral)   Resp 16   Ht 1.905 m (6' 3\")   Wt 112.3 kg (247 lb 9.6 oz)   SpO2 91%.     Respiratory: No respiratory distress. On RA.     GI/: Continent of bowel and bladder. Flatus passing.     Diet/appetite: Regular diet. Denies nausea.     Activity:  SBA of 1.     Pain: Tylenol given.     Skin: Dressing intact to abdomen. Abdominal binder in place.     LDA's:  R&L hand PIV. SL.    Plan: To be discharge in the morning.   "

## 2022-10-20 NOTE — PROGRESS NOTES
Transplant Surgery  Inpatient Daily Progress Note  10/20/2022    Mr. Pimentel is Post-operative day #2 s/p laparoscopic left donor nephrectomy. Issues Overnight: Pain controlled. Fever to 102F overnight. Asymptomatic.     Patient Vitals for the past 24 hrs:   BP Temp Temp src Pulse Resp SpO2 Weight   10/20/22 1031 125/64 98.6  F (37  C) -- 82 16 93 % --   10/20/22 0908 -- 99.4  F (37.4  C) -- -- -- -- --   10/20/22 0528 125/64 100.1  F (37.8  C) Oral 64 16 93 % 112.8 kg (248 lb 9.6 oz)   10/20/22 0144 126/57 (!) 100.5  F (38.1  C) Oral 76 16 93 % --   10/19/22 2308 -- 100.1  F (37.8  C) Oral -- -- -- --   10/19/22 2215 -- (!) 101.5  F (38.6  C) Oral -- -- -- --   10/19/22 2147 115/68 (!) 102.1  F (38.9  C) Oral 79 16 91 % --   10/19/22 1744 116/80 98.3  F (36.8  C) Axillary 76 16 95 % --   10/19/22 1405 107/81 98.5  F (36.9  C) Oral 74 14 95 % --       Pain: Visual Analog Score: 6  Nausea:    [x]    None      []    Some, but not needing medication    []    Yes, needing medication      []    Dry Retching    []    Vomited    DREAMS Performance:    DRinking: yes   Eating: yes   Analgesia: OK   Mobilizing:  yes   Slept: yes    Passed flatus? No  Incision(s): C/D/I with no jhonny-incisional ecchymoses  Abdomen: no distention, appropriately tender, soft  Extremities: no cyanosis or edema   Neuro: A&Ox4    Allergies:   Allergies   Allergen Reactions     Sulfa Drugs Hives       Medications:  Prescription Medications as of 10/20/2022       Rx Number Disp Refills Start End Last Dispensed Date Next Fill Date Owning Pharmacy    calcium citrate (CITRACAL) 950 (200 Ca) MG tablet            Sig: Take 1 tablet by mouth daily    Class: Historical    Route: Oral    Cholecalciferol 10 MCG (400 UNIT) CAPS            Sig: Take 400 Units by mouth daily    Class: Historical    Route: Oral    Flaxseed Oil (LINSEED OIL) OIL            Sig: Take 1 tablet by mouth daily    Class: Historical    Route: Oral    hydrochlorothiazide (HYDRODIURIL) 25 MG  tablet    10/4/2021        Sig: Take 25 mg by mouth daily    Class: Historical    Route: Oral    mesalamine (LIALDA) 1.2 g DR tablet    7/20/2012        Sig: Take 300-600 mg by mouth daily    Class: Historical    Route: Oral    Multiple Vitamin (MULTI-VITAMINS) TABS            Sig: Take 1 tablet by mouth daily    Class: Historical    Route: Oral    omega 3 1200 MG CAPS            Sig: Take 1 capsule by mouth daily    Class: Historical    Route: Oral      Hospital Medications as of 10/20/2022       Dose Frequency Start End    acetaminophen (TYLENOL) tablet 650 mg 650 mg EVERY 6 HOURS 10/18/2022 10/20/2022    Admin Instructions: Administer for multimodal surgical pain management.  Pharmacy to time the next dose 8 hours from PRE OP dose.  Maximum acetaminophen dose from all sources = 75 mg/kg/day not to exceed 4 grams/day.    Class: E-Prescribe    Route: Oral    bisacodyl (DULCOLAX) suppository 10 mg 10 mg DAILY 10/19/2022     Admin Instructions: Start POD 1, then daily until BM. Hold for loose stools.  Hold for loose stools.    Class: E-Prescribe    Route: Rectal    famotidine (PEPCID) tablet 20 mg 20 mg DAILY 10/18/2022     Class: E-Prescribe    Route: Oral    ketorolac (TORADOL) injection 10 mg (Completed) 10 mg EVERY 8 HOURS 10/18/2022 10/19/2022    Admin Instructions: Pharmacy to time the first dose based on the timing of the INTRA-OP dose.  Can cause pain on injection.  If ordered intravenously (IV) : administer through a running maintenance fluid over 1 minute followed by a flush.  If patient complains of pain on injection, may dilute 15-30 mg in 5 mL and push over 1 to 2 minutes.      Class: E-Prescribe    Route: Intravenous    mesalamine (LIALDA) DR tablet 1.2 g 1.2 g DAILY WITH BREAKFAST 10/20/2022     Class: E-Prescribe    Route: Oral    naloxone (NARCAN) injection 0.2 mg 0.2 mg EVERY 2 MIN PRN 10/18/2022     Admin Instructions: Administer intravenous route when available and notify provider when  administered.  For unintended sedation or respiratory depression if all of the below criteria are met:  ~ respiratory rate LESS than or EQUAL to 8.   ~SaO2 less than 92% and or/end-tidal CO2 is greater than 50.  ~ the patient is receiving an opioid, has unintended sedations assessed as RASS (-3), and is currently not on mechanical ventilation.  RASS scale moderate (-3) is movement or eye opening to voice but no eye contact.    Patient Monitoring  Once the patient has demonstrated a response to the naloxone, continue to monitor respiratory rate, depth, oxygen saturation and end-tidal CO2 (if available) every 15 minutes x 2, then every 30 minutes x 2, then every 1 hour x 1 after each naloxone dose.  Consider transfer to ICU if patient respiratory parameters have not improved after 4 naloxone doses.    Class: E-Prescribe    Route: Intravenous    Linked Group 1: See [facility] for full Linked Orders Report.        naloxone (NARCAN) injection 0.2 mg 0.2 mg EVERY 2 MIN PRN 10/18/2022     Admin Instructions: Administer intramuscular if an intravenous route is not available and notify provider when administered.  For unintended sedation or respiratory depression if all of the below criteria are met:  ~ respiratory rate LESS than or EQUAL to 8.   ~SaO2 less than 92% and or/end-tidal CO2 is greater than 50.  ~ the patient is receiving an opioid, has unintended sedations assessed as RASS (-3), and is currently not on mechanical ventilation.  RASS scale moderate (-3) is movement or eye opening to voice but no eye contact.    Patient Monitoring  Once the patient has demonstrated a response to the naloxone, continue to monitor respiratory rate, depth, oxygen saturation and end-tidal CO2 (if available) every 15 minutes x 2, then every 30 minutes x 2, then every 1 hour x 1 after each naloxone dose.  Consider transfer to ICU if patient respiratory parameters have not improved after 4 naloxone doses.    Class: E-Prescribe    Route:  Intramuscular    Linked Group 1: See [facility] for full Linked Orders Report.        naloxone (NARCAN) injection 0.4 mg 0.4 mg EVERY 2 MIN PRN 10/18/2022     Admin Instructions: Administer intravenous route when available and notify provider when administered.  For unintended sedation or respiratory depression if all of the below criteria are met:  ~ respiratory rate LESS than or EQUAL to 8.  ~ SaO2 less than 92% and or/end-tidal CO2 is greater than 50.  ~ the patient is receiving an opioid, has unintended sedation assessed as RASS (-4) or (-5) and patient is currently not on mechanical ventilation.  RASS scale (-4) is deep sedation with no response to voice but movement or eye opening to physical stimulation.  RASS scale (-5) is unarousable.      Patient Monitoring  Once the patient has demonstrated a response to the naloxone, continue to monitor respiratory rate, depth, oxygen saturation and end-tidal CO2 (if available) every 15 minutes x 2, then every 30 minutes x 2, then every 1 hour x 1 after each naloxone dose.  Consider transfer to ICU if patient respiratory parameters have not improved after 4 naloxone doses.    Class: E-Prescribe    Route: Intravenous    Linked Group 1: See [facility] for full Linked Orders Report.        naloxone (NARCAN) injection 0.4 mg 0.4 mg EVERY 2 MIN PRN 10/18/2022     Admin Instructions: Administer intramuscular if an intravenous route is not available and notify provider when administered.  For unintended sedation or respiratory depression if all of the below criteria are met:  ~ respiratory rate LESS than or EQUAL to 8.  ~ SaO2 less than 92% and or/end-tidal CO2 is greater than 50.  ~ the patient is receiving an opioid, has unintended sedation assessed as RASS (-4) or (-5) and patient is currently not on mechanical ventilation.  RASS scale (-4) is deep sedation with no response to voice but movement or eye opening to physical stimulation.  RASS scale (-5) is unarousable.       Patient Monitoring  Once the patient has demonstrated a response to the naloxone, continue to monitor respiratory rate, depth, oxygen saturation and end-tidal CO2 (if available) every 15 minutes x 2, then every 30 minutes x 2, then every 1 hour x 1 after each naloxone dose.  Consider transfer to ICU if patient respiratory parameters have not improved after 4 naloxone doses.    Class: E-Prescribe    Route: Intramuscular    Linked Group 1: See [facility] for full Linked Orders Report.        ondansetron (ZOFRAN ODT) ODT tab 4 mg 4 mg EVERY 6 HOURS PRN 10/18/2022     Admin Instructions: This is Step 1 of nausea and vomiting management.  If nausea not resolved in 15 minutes, go to Step 2 prochlorperazine (COMPAZINE). Do not push through foil backing. Peel back foil and gently remove. Place on tongue immediately. Administration with liquid unnecessary  With dry hands, peel back foil backing and gently remove tablet. Do not push oral disintegrating tablet through foil backing. Administer immediately on tongue and oral disintegrating tablet dissolves in seconds, then swallow with saliva. Liquid not required.    Class: E-Prescribe    Route: Oral    Linked Group 2: See [facility] for full Linked Orders Report.        ondansetron (ZOFRAN) injection 4 mg 4 mg EVERY 6 HOURS PRN 10/18/2022     Admin Instructions: This is Step 1 of nausea and vomiting management.  If nausea not resolved in 15 minutes, go to Step 2 prochlorperazine (COMPAZINE).  Irritant.    Class: E-Prescribe    Route: Intravenous    Linked Group 2: See [facility] for full Linked Orders Report.        oxyCODONE (ROXICODONE) tablet 5-10 mg 5-10 mg EVERY 3 HOURS PRN 10/18/2022     Admin Instructions: ~ Start at the lowest dose.  ~ May adjust dose by 5 mg every 3 hours to optimize patient mobilization and comfort as needed.  Maximum individual dose is 10 mg.  ~ Hold dose for analgesic side effects.    ~ Notify provider to assess patient for uncontrolled pain or  analgesic side effects.   ~ Maximum total is 80 mg in 24 hours.    Route: Oral    prochlorperazine (COMPAZINE) injection 10 mg 10 mg EVERY 6 HOURS PRN 10/18/2022     Admin Instructions: This is Step 2 of nausea and vomiting management.   If nausea not resolved in 15-30 minutes, Notify provider.    Class: E-Prescribe    Route: Intravenous    Linked Group 3: See [facility] for full Linked Orders Report.        prochlorperazine (COMPAZINE) tablet 10 mg 10 mg EVERY 6 HOURS PRN 10/18/2022     Admin Instructions: This is Step 2 of nausea and vomiting management.   If nausea not resolved in 15-30 minutes, Notify provider.    Class: E-Prescribe    Route: Oral    Linked Group 3: See [facility] for full Linked Orders Report.        senna-docusate (SENOKOT-S/PERICOLACE) 8.6-50 MG per tablet 1 tablet 1 tablet 2 TIMES DAILY 10/18/2022     Admin Instructions: If no bowel movement in 24 hours, increase to 2 tablets PO.  Hold for loose stools.  Hold for loose stools.    Class: E-Prescribe    Route: Oral    Linked Group 4: See [facility] for full Linked Orders Report.        senna-docusate (SENOKOT-S/PERICOLACE) 8.6-50 MG per tablet 2 tablet 2 tablet 2 TIMES DAILY 10/18/2022     Admin Instructions: Hold for loose stools.  Hold for loose stools.    Class: E-Prescribe    Route: Oral    Linked Group 4: See [facility] for full Linked Orders Report.              Labs:  Lab Results   Component Value Date    CR 1.54 (H) 10/19/2022    CR 0.87 10/18/2022    CR 0.93 10/17/2022     Lab Results   Component Value Date    HGB 13.5 10/20/2022    HGB 13.4 10/19/2022    HGB 14.7 10/18/2022       Assessment: Post-operative day #2, doing fairly well. Fever.    Plan:   -Encouraged ambulation and ISB   -Continue GI and DVT prophylaxis  -Pain control: Scheduled acetaminophen & toradol. Encourage oral analgesia prn.  -Fever: Asymptomatic. Check CXR. UA yesterday no pyuria. No unusual abdominal pain. Increase incentive spirometry.    Discharge planning:  Tomorrow.     GREGORIO Stoner CNP  Division of Transplantation  Pager [phone]    Attestation: I saw and examined the patient with Gita Jay NP, and the transplant team. I independently reviewed all pertinent laboratory and imaging information and made independent management decisions including immunosuppression management. I agree with the findings and plan as documented in this note.  Daryl Denton MD

## 2022-10-21 VITALS
WEIGHT: 244.9 LBS | SYSTOLIC BLOOD PRESSURE: 148 MMHG | HEART RATE: 77 BPM | DIASTOLIC BLOOD PRESSURE: 80 MMHG | RESPIRATION RATE: 18 BRPM | HEIGHT: 75 IN | TEMPERATURE: 99.5 F | BODY MASS INDEX: 30.45 KG/M2 | OXYGEN SATURATION: 98 %

## 2022-10-21 PROCEDURE — 250N000013 HC RX MED GY IP 250 OP 250 PS 637: Performed by: SURGERY

## 2022-10-21 PROCEDURE — 250N000013 HC RX MED GY IP 250 OP 250 PS 637: Performed by: NURSE PRACTITIONER

## 2022-10-21 RX ORDER — HYDROCHLOROTHIAZIDE 25 MG/1
TABLET ORAL
Start: 2022-10-21

## 2022-10-21 RX ORDER — AMOXICILLIN 250 MG
1-2 CAPSULE ORAL 2 TIMES DAILY
Qty: 60 TABLET | Refills: 0 | Status: SHIPPED | OUTPATIENT
Start: 2022-10-21

## 2022-10-21 RX ORDER — POLYETHYLENE GLYCOL 3350 17 G/17G
1 POWDER, FOR SOLUTION ORAL DAILY
Qty: 527 G | Refills: 0 | Status: SHIPPED | OUTPATIENT
Start: 2022-10-21

## 2022-10-21 RX ORDER — OXYCODONE HYDROCHLORIDE 5 MG/1
2.5-5 TABLET ORAL EVERY 4 HOURS PRN
Qty: 14 TABLET | Refills: 0 | Status: SHIPPED | OUTPATIENT
Start: 2022-10-21

## 2022-10-21 RX ORDER — ACETAMINOPHEN 325 MG/1
325-650 TABLET ORAL EVERY 6 HOURS PRN
Qty: 100 TABLET | Refills: 0 | Status: SHIPPED | OUTPATIENT
Start: 2022-10-21

## 2022-10-21 RX ORDER — POLYETHYLENE GLYCOL 3350 17 G/17G
17 POWDER, FOR SOLUTION ORAL DAILY
Status: DISCONTINUED | OUTPATIENT
Start: 2022-10-21 | End: 2022-10-21 | Stop reason: HOSPADM

## 2022-10-21 RX ORDER — OXYCODONE HYDROCHLORIDE 5 MG/1
2.5-5 TABLET ORAL EVERY 4 HOURS PRN
Qty: 14 TABLET | Refills: 0 | Status: SHIPPED | OUTPATIENT
Start: 2022-10-21 | End: 2022-10-21

## 2022-10-21 RX ADMIN — OXYCODONE HYDROCHLORIDE 5 MG: 5 TABLET ORAL at 05:33

## 2022-10-21 RX ADMIN — MESALAMINE 1.2 G: 1.2 TABLET, DELAYED RELEASE ORAL at 08:45

## 2022-10-21 RX ADMIN — FAMOTIDINE 20 MG: 20 TABLET ORAL at 08:45

## 2022-10-21 ASSESSMENT — ACTIVITIES OF DAILY LIVING (ADL)
ADLS_ACUITY_SCORE: 33

## 2022-10-21 NOTE — CARE PLAN
Pt. discharged at 1145 to home, was accompanied by spouse, and left with personal belongings. Pt. received complete discharge paperwork and medications as filled by discharge pharmacy. Pt. was given times of last dose for all discharge medications in writing on discharge medication sheets. Discharge teaching included medication, pain management, activity restrictions, and signs and symptoms of infection. Pt. to follow up with provider for follow up appointment tomorrow. Pt. had no further questions at the time of discharge and no unmet needs were identified.

## 2022-10-21 NOTE — PHARMACY-CONSULT NOTE
Solid Organ Transplant Donor Prior to Discharge Note  62 year old male s/p kidney donation surgery on 10/18/2022.     Pharmacy has monitored for any potential medication issues.  In anticipation for discharge, medication therapy needs have been addressed daily throughout the current admission via multidisciplinary rounds and/or discussions, order verification, daily clinical pharmacy review, and communication with prescribers.    Pilar Gamez, Pharm.D., BCPS, BCTXP  Pager [phone]

## 2022-10-21 NOTE — DISCHARGE SUMMARY
[facility]    Discharge Summary  Solid Organ Transplant Surgery    Date of Admission:  10/18/2022  Date of Discharge:  10/21/2022  Date of Service (when I saw the patient): 10/21/22    Discharge Diagnoses   Principal Problem:    Encounter for donation of kidney  Active Problems:    Acute post-operative pain    Fever postop      Procedure/Surgery Information   Procedure: Procedure(s):  Laparoscopic Hand Assisted Left Kidney Living Unrelated Transplant Donor (Kidney Paired Exchange, Standard Voucher Donor, Recipient at another center)   Surgeon(s): Surgeon(s) and Role:     * Celeste Florian MD - Primary     * Baltazar Copeland MD - Fellow - Assisting     * Moe Price MD - Fellow - Assisting             History of Present Illness   Jose Pimentel is a 62 year old male with history of Crohns who presented for nephrectomy for kidney donation.    Hospital Course   Jose Pimentel was admitted on 10/18/2022 and underwent nephrectomy for kidney donation. Tolerating oral diet, ambulating, and pain controlled by day of discharge.    Post-op fever: Developed a fever to 102F on POD #1. Patient was completely asymptomatic. Pre-op UA was negative. CXR showed only atelectasis. Abdominal exam as expected for post-op day. Tmax 100.9F on day of discharge. Will follow up in clinic in the morning.    Discharge Disposition   Discharged to [facility]   Condition at discharge: Stable    Primary Care Physician   Karen Centeno    Physical Exam   Temp: 99.9  F (37.7  C) Temp src: Oral BP: (!) 148/80 Pulse: 77   Resp: 18 SpO2: 96 % O2 Device: None (Room air)    Vitals:    10/19/22 0611 10/20/22 0528 10/21/22 0559   Weight: 112.3 kg (247 lb 9.6 oz) 112.8 kg (248 lb 9.6 oz) 111.1 kg (244 lb 14.4 oz)     Vital Signs with Ranges  Temp:  [98.6  F (37  C)-100.9  F (38.3  C)] 99.9  F (37.7  C)  Pulse:  [74-82] 77  Resp:  [16-18] 18  BP: (107-148)/(61-80) 148/80  SpO2:  [93 %-96 %] 96 %  I/O last 3 completed  "shifts:  In: -   Out: 3400 [Urine:3400]    Constitutional: NAD  Eyes: Clear  Respiratory: Unlabored, RA  Cardiovascular: Perfused, HR 70s  GI: Rounded, minimal bruising, no erythema, incisions intact  Lymph/Hematologic: No edema  Genitourinary: No hooper  Skin: Pink  Musculoskeletal: Strength 5/5  Neurologic: A&Ox4  Neuropsychiatric: Calm    Consultations This Hospital Stay   PHARMACY IP CONSULT  SOT MEDICATION HISTORY IP PHARMACY CONSULT  CARE MANAGEMENT / SOCIAL WORK IP CONSULT    Time Spent on this Encounter   I have spent less than 30 minutes on this discharge.    Discharge Orders       Reason for your hospital stay    Donation of kidney     Activity    Your activity upon discharge: Don't lift anything heavier than 10 pounds for 8 weeks (or longer if your surgeon has discussed this with you).  Walk regularly.    OK to drive only after no longer taking narcotics AND you feel comfortable working the breaks/clutch suddenly if needed.  Limit sports and strenuous activities/'core' exercises for 8 weeks.  If you experience pain after exertion, try an ice pack or warm pack to the area.   Wear abdominal binder for comfort if you desire.    You have been instructed to avoid NSAIDs (medications such as ibuprofen, \"Motrin\", naproxen, diclofenac) as these medications may affect the remaining kidney. Take other medications as prescribed.    Keep narcotics out of reach of children. When finished with them, please destroy/discard any remaining pills responsibly. Often, your [facility] government office, local police station or pharmacy will have a drug deposit box.     Adult [facility] Follow-up and recommended labs and tests    1. [facility] in [facility] building ([address] [place] [address]) on Saturday 10/22 at 8am. They will page Dr. Copeland to see you when you arrive.  2. Follow up with Dr. Florian in Transplant Clinic on 10/24/22, arrive at 3:30pm.    If you need to change your " appointment please contact your coordinator at [phone].     When to contact your care team    Your transplant coordinator if you have any of the following:   Swelling, oozing, worsening pain, unusual redness around the incisions  Fever of 102 F or higher   Increasing abdominal pain   Nausea or vomiting   Severe diarrhea, bloating, or constipation     Any concerns or questions, please call your Transplant coordinator:    Phone: [phone].  If they are not available, the on call coordinator/MD will help you with your concern.  If you are unable to reach a coordinator and have an urgent medical questions, please call the hospital at [phone] and ask to have the Pancreas Transplant Surgery fellow on-call paged.     Wound care and dressings    Instructions to care for your wound at home:    If your incisions have STERI STRIPS, leave steri strips in place until they curl and peel off. Please don't shower until 48 hours after surgery. Then gently wash with soap and water, but do not scrub them. Do not soak in a bath until the strips have naturally fallen off and any openings are closed (at least 2 weeks).     Diet    Follow this diet upon discharge: Keep yourself well hydrated (goal 1500 ml/day).   Eat lightly the first week as tolerated and avoid constipating foods.  If you are constipated, take a stool softener like Senna, Dulcolax, Miralax, or a Fleets enema (available over the counter).     Discharge Medications    Current Discharge Medication List      START taking these medications    Details   acetaminophen (TYLENOL) 325 MG tablet Take 1-2 tablets (325-650 mg) by mouth every 6 hours as needed for mild pain  Qty: 100 tablet, Refills: 0    Associated Diagnoses: Encounter for donation of kidney      oxyCODONE (ROXICODONE) 5 MG tablet Take 0.5-1 tablets (2.5-5 mg) by mouth every 4 hours as needed for moderate to severe pain (Use acetaminophen first)  Qty: 14 tablet, Refills: 0    Associated Diagnoses:  Encounter for donation of kidney      polyethylene glycol (MIRALAX) 17 GM/Dose powder Take 17 g (1 capful) by mouth daily  Qty: 527 g, Refills: 0    Associated Diagnoses: Encounter for donation of kidney      senna-docusate (SENOKOT-S/PERICOLACE) 8.6-50 MG tablet Take 1-2 tablets by mouth 2 times daily  Qty: 60 tablet, Refills: 0    Associated Diagnoses: Encounter for donation of kidney         CONTINUE these medications which have CHANGED    Details   hydrochlorothiazide (HYDRODIURIL) 25 MG tablet Hold until follow up with surgeon    Associated Diagnoses: Encounter for donation of kidney         CONTINUE these medications which have NOT CHANGED    Details   calcium citrate (CITRACAL) 950 (200 Ca) MG tablet Take 1 tablet by mouth daily      Cholecalciferol 10 MCG (400 UNIT) CAPS Take 400 Units by mouth daily      Flaxseed Oil (LINSEED OIL) OIL Take 1 tablet by mouth daily      mesalamine (LIALDA) 1.2 g DR tablet Take 300-600 mg by mouth daily      Multiple Vitamin (MULTI-VITAMINS) TABS Take 1 tablet by mouth daily      omega 3 1200 MG CAPS Take 1 capsule by mouth daily           Allergies   Allergies   Allergen Reactions     Sulfa Drugs Hives     Data   Most Recent 3 CBC's:Recent Labs   Lab Test 10/20/22  1009 10/19/22  0006 10/18/22  1252 10/17/22  1010 06/02/22  0646   WBC 11.8*  --  14.9*  --  5.8   HGB 13.5 13.4 14.7   < > 16.3   MCV 89  --  90  --  89     --  170  --  186    < > = values in this interval not displayed.     Most Recent 3 BMP's:Recent Labs   Lab Test 10/20/22  1010 10/19/22  0006 10/18/22  0546 10/18/22  0523 10/17/22  1010 06/02/22  0646     --  141  --   --  139   POTASSIUM 4.2  --  4.1  --   --  3.5   CHLORIDE 104  --  109*  --   --  104   CO2 24  --  23  --   --  27   BUN 20.2 20.6 17.4  --   --  15   CR 1.70* 1.54* 0.87  --    < > 0.94   ANIONGAP 8  --  9  --   --  8   JERED 8.9  --  9.1  --   --  9.0   *  --  80 86  --  114*    < > = values in this interval not displayed.    I have reviewed history, examined patient and discussed plan with the fellow/resident/ALMA.    I concur with the findings in this note.    Time spent on discharge activities: 45 minutes.

## 2022-10-21 NOTE — PROGRESS NOTES
"BP (!) 146/73 (BP Location: Right arm)   Pulse 80   Temp (!) 100.9  F (38.3  C) (Oral)   Resp 18   Ht 1.905 m (6' 3\")   Wt 112.8 kg (248 lb 9.6 oz)   SpO2 95%   BMI 31.07 kg/m       Neuro: AOx4, cooperative with cares. Provider was notified of 100.9 tempt.  Cardiac: denies chest pain, WDL  Respiratory:  On RA, denies SOB  GI/: voiding adequately, no BM. +BS, passing flatus  Diet/Appetite: on regular diet  Skin: WDL ex incision adhesive strips, JANICE. Binder in place  LDA: PIV-SL  Activity: up ad alida, ambulated in the pantoja   Pain: abd pain managed with PRN oxycodone  Plan: conitnmegan POC, possible discharge today    "

## 2022-10-21 NOTE — PROGRESS NOTES
Transplant Surgery Brief Note (Overnight)    PATIENT NAME: Jose Pimentel  MRN: [id number]  DATE: 10/21/2022 2:45 AM  LAST PROCEDURE: Laparoscopic Hand Assisted Left Kidney Living Unrelated Transplant Donor (Kidney Paired Exchange, Standard Voucher Donor, Recipient at another center):  (10/18/2022)    Paged regarding temp of 100.9F. Similar presentation to last night, where the patient was asymptomatic but briefly febrile ON. Last BP reading was elevated from prior and now hypertensive.    Vital Signs: Most Recent  Ranges (24 hours)   Temp: (!) 100.9  F (38.3  C)  Pulse: 80  BP: (!) 146/73  Resp: 18  SpO2: 95 % Temp  Min: 98.6  F (37  C)  Max: 100.9  F (38.3  C)  Pulse  Min: 64  Max: 82  BP  Min: 107/61  Max: 146/73  Resp  Min: 16  Max: 18  SpO2  Min: 93 %  Max: 95 %   O2 Device: None (Room air)     Recent Labs   Lab 10/20/22  1009 10/19/22  0006 10/18/22  1252 10/17/22  1010   WBC 11.8*  --  14.9*  --    HGB 13.5 13.4 14.7 16.2     --  170  --      Plan:  # Post-Operative Fever  - May have some component of acute inflammation from operation, however on POD 3, this is less likely. APAP was discontinued earlier today, which may have been suppressing his physiologic pyrexia until this evening. Based on asymptomatic presentation and declining WBC count, ongoing infection is unlikely as well. While it is tempting to say atelectasis may be a cause of post-op fever, this is not evidence based (See the Chest 2011 review article on atelectasis). Continue to monitor with routine vital checks.  - Consider reinitiating APAP if the patient expresses discomfort d/t fever.    Gabo Chandra MD   General Surgery Resident

## 2022-10-22 ENCOUNTER — APPOINTMENT (OUTPATIENT)
Dept: INFUSION THERAPY | Facility: CLINIC | Age: 62
End: 2022-10-22
Attending: SURGERY

## 2022-10-24 ENCOUNTER — OFFICE VISIT (OUTPATIENT)
Dept: TRANSPLANT | Facility: CLINIC | Age: 62
End: 2022-10-24

## 2022-10-24 ENCOUNTER — OFFICE VISIT (OUTPATIENT)
Dept: TRANSPLANT | Facility: CLINIC | Age: 62
End: 2022-10-24
Attending: SURGERY
Payer: COMMERCIAL

## 2022-10-24 VITALS
OXYGEN SATURATION: 97 % | SYSTOLIC BLOOD PRESSURE: 141 MMHG | TEMPERATURE: 97.9 F | BODY MASS INDEX: 29.56 KG/M2 | HEART RATE: 67 BPM | DIASTOLIC BLOOD PRESSURE: 75 MMHG | WEIGHT: 236.5 LBS

## 2022-10-24 DIAGNOSIS — Z53.9 ERRONEOUS ENCOUNTER--DISREGARD: Primary | ICD-10-CM

## 2022-10-24 DIAGNOSIS — Z52.4 KIDNEY DONOR: ICD-10-CM

## 2022-10-24 PROCEDURE — 99024 POSTOP FOLLOW-UP VISIT: CPT | Performed by: SURGERY

## 2022-10-24 ASSESSMENT — PAIN SCALES - GENERAL: PAINLEVEL: NO PAIN (0)

## 2022-10-24 NOTE — LETTER
10/24/2022         RE: Jose Pimentel  [address]  [place] [address]        Dear Colleague,    Thank you for referring your patient, Jose Pimentel, to the [facility]. Please see a copy of my visit note below.      This encounter was opened in error. Please disregard.      Again, thank you for allowing me to participate in the care of your patient.        Sincerely,        Celeste Florian MD

## 2022-10-24 NOTE — PROGRESS NOTES
Transplant Surgery Kidney Donor Progress Note     Medical record number: [id number]  YOB: 1960,   Date of Visit:  10/24/2022  For followup after kidney donation.    Assessment and Recommendations: Mr. Pimentel is doing well after kidney donation.     1. Lifting restrictions: 10 lbs until 8 weeks post donation.  2. Followup: 4 weeks as needed  3. Return to work to be determined per patient's progress, expected between 6-8 weeks after surgery.    Total time: 20 minutes  Counselling time: 10 minutes    .    ------------------------------------------------------------------------------------------    S: Mr. Pimentel donate a kidney 1 weeks ago and is doing well, reporting no fevers, chills, dysuria.  He is not and is taking narcotic analgesia.  He is not constipated.  He is mostly back to routine activities of daily living and is not feeling ready to return to work at this time.    Medications:  Prescription Medications as of 10/24/2022       Rx Number Disp Refills Start End Last Dispensed Date Next Fill Date Owning Pharmacy    acetaminophen (TYLENOL) 325 MG tablet  100 tablet 0 10/21/2022    [facility] [address]    Sig: Take 1-2 tablets (325-650 mg) by mouth every 6 hours as needed for mild pain    Class: E-Prescribe    Route: Oral    calcium citrate (CITRACAL) 950 (200 Ca) MG tablet            Sig: Take 1 tablet by mouth daily    Class: Historical    Route: Oral    Cholecalciferol 10 MCG (400 UNIT) CAPS            Sig: Take 400 Units by mouth daily    Class: Historical    Route: Oral    Flaxseed Oil (LINSEED OIL) OIL            Sig: Take 1 tablet by mouth daily    Class: Historical    Route: Oral    hydrochlorothiazide (HYDRODIURIL) 25 MG tablet    10/21/2022        Sig: Hold until follow up with surgeon    Class: No Print Out    mesalamine (LIALDA) 1.2 g DR tablet    7/20/2012        Sig: Take 300-600 mg by mouth daily    Class: Historical    Route: Oral     Multiple Vitamin (MULTI-VITAMINS) TABS            Sig: Take 1 tablet by mouth daily    Class: Historical    Route: Oral    omega 3 1200 MG CAPS            Sig: Take 1 capsule by mouth daily    Class: Historical    Route: Oral    oxyCODONE (ROXICODONE) 5 MG tablet  14 tablet 0 10/21/2022        Sig: Take 0.5-1 tablets (2.5-5 mg) by mouth every 4 hours as needed for moderate to severe pain (Use acetaminophen first)    Class: Local Print    Earliest Fill Date: 10/21/2022    Route: Oral    polyethylene glycol (MIRALAX) 17 GM/Dose powder  527 g 0 10/21/2022    [place], [place] - [address] [facility]    Sig: Take 17 g (1 capful) by mouth daily    Class: E-Prescribe    Route: Oral    senna-docusate (SENOKOT-S/PERICOLACE) 8.6-50 MG tablet  60 tablet 0 10/21/2022    [place], [place] - [address] [facility]    Sig: Take 1-2 tablets by mouth 2 times daily    Class: E-Prescribe    Route: Oral          Exam:   Pulse:  [67] 67  BP: (141)/(75) 141/75  SpO2:  [97 %] 97 %  Appearance: in mild distress.   Skin: normal  Head and Neck: Normal, no rashes or jaundice  Respiratory: normal respiratory excursions, no audible wheeze  Cardiovascular: RRR  Abdomen: rounded, Incision clean and dry  Extremeties: Edema, none  Neuro: without deficit       Labs:   Admission on 10/18/2022, Discharged on 10/21/2022   Component Date Value Ref Range Status     Hold Specimen 10/18/2022 Specimen Received   Final     Sodium 10/18/2022 141  136 - 145 mmol/L Final     Potassium 10/18/2022 4.1  3.4 - 5.3 mmol/L Final     Chloride 10/18/2022 109 (H)  98 - 107 mmol/L Final     Carbon Dioxide (CO2) 10/18/2022 23  22 - 29 mmol/L Final     Anion Gap 10/18/2022 9  7 - 15 mmol/L Final     Urea Nitrogen 10/18/2022 17.4  8.0 - 23.0 mg/dL Final     Creatinine 10/18/2022 0.87  0.67 - 1.17 mg/dL Final     Calcium 10/18/2022 9.1  8.8 - 10.2 mg/dL Final     Glucose 10/18/2022 80  70 - 99 mg/dL Final     GFR  Estimate 10/18/2022 >90  >60 mL/min/1.73m2 Final    Effective December 21, 2021 eGFRcr in adults is calculated using the 2021 CKD-EPI creatinine equation which includes age and gender (Naomi et al., [facility], DOI: 10.1056/CSJQkb3901842)     GLUCOSE BY METER POCT 10/18/2022 86  70 - 99 mg/dL Final     WBC Count 10/18/2022 14.9 (H)  4.0 - 11.0 10e3/uL Final     RBC Count 10/18/2022 4.78  4.40 - 5.90 10e6/uL Final     Hemoglobin 10/18/2022 14.7  13.3 - 17.7 g/dL Final     Hematocrit 10/18/2022 43.1  40.0 - 53.0 % Final     MCV 10/18/2022 90  78 - 100 fL Final     MCH 10/18/2022 30.8  26.5 - 33.0 pg Final     MCHC 10/18/2022 34.1  31.5 - 36.5 g/dL Final     RDW 10/18/2022 12.7  10.0 - 15.0 % Final     Platelet Count 10/18/2022 170  150 - 450 10e3/uL Final     CK 10/18/2022 103  39 - 308 U/L Final     Hemoglobin 10/19/2022 13.4  13.3 - 17.7 g/dL Final     Hematocrit 10/19/2022 39.2 (L)  40.0 - 53.0 % Final     Urea Nitrogen 10/19/2022 20.6  8.0 - 23.0 mg/dL Final     Creatinine 10/19/2022 1.54 (H)  0.67 - 1.17 mg/dL Final     GFR Estimate 10/19/2022 51 (L)  >60 mL/min/1.73m2 Final    Effective December 21, 2021 eGFRcr in adults is calculated using the 2021 CKD-EPI creatinine equation which includes age and gender (Naomi bryant al., [facility], DOI: 10.1056/IEJIyf7716815)     Color Urine 10/19/2022 Straw  Colorless, Straw, Light Yellow, Yellow Final     Appearance Urine 10/19/2022 Clear  Clear Final     Glucose Urine 10/19/2022 Negative  Negative mg/dL Final     Bilirubin Urine 10/19/2022 Negative  Negative Final     Ketones Urine 10/19/2022 Negative  Negative mg/dL Final     Specific Gravity Urine 10/19/2022 1.009  1.003 - 1.035 Final     Blood Urine 10/19/2022 Negative  Negative Final     pH Urine 10/19/2022 6.0  5.0 - 7.0 Final     Protein Albumin Urine 10/19/2022 Negative  Negative mg/dL Final     Urobilinogen Urine 10/19/2022 Normal  Normal, 2.0 mg/dL Final     Nitrite Urine 10/19/2022 Negative  Negative Final     Leukocyte  Esterase Urine 10/19/2022 Negative  Negative Final     Mucus Urine 10/19/2022 Present (A)  None Seen /LPF Final     RBC Urine 10/19/2022 0  <=2 /HPF Final     WBC Urine 10/19/2022 <1  <=5 /HPF Final     CK 10/19/2022 322 (H)  39 - 308 U/L Final     Hold Specimen 10/19/2022 JIC   Final     WBC Count 10/20/2022 11.8 (H)  4.0 - 11.0 10e3/uL Final     RBC Count 10/20/2022 4.48  4.40 - 5.90 10e6/uL Final     Hemoglobin 10/20/2022 13.5  13.3 - 17.7 g/dL Final     Hematocrit 10/20/2022 39.8 (L)  40.0 - 53.0 % Final     MCV 10/20/2022 89  78 - 100 fL Final     MCH 10/20/2022 30.1  26.5 - 33.0 pg Final     MCHC 10/20/2022 33.9  31.5 - 36.5 g/dL Final     RDW 10/20/2022 12.9  10.0 - 15.0 % Final     Platelet Count 10/20/2022 150  150 - 450 10e3/uL Final     Sodium 10/20/2022 136  136 - 145 mmol/L Final     Potassium 10/20/2022 4.2  3.4 - 5.3 mmol/L Final     Chloride 10/20/2022 104  98 - 107 mmol/L Final     Carbon Dioxide (CO2) 10/20/2022 24  22 - 29 mmol/L Final     Anion Gap 10/20/2022 8  7 - 15 mmol/L Final     Urea Nitrogen 10/20/2022 20.2  8.0 - 23.0 mg/dL Final     Creatinine 10/20/2022 1.70 (H)  0.67 - 1.17 mg/dL Final     Calcium 10/20/2022 8.9  8.8 - 10.2 mg/dL Final     Glucose 10/20/2022 108 (H)  70 - 99 mg/dL Final     GFR Estimate 10/20/2022 45 (L)  >60 mL/min/1.73m2 Final    Effective December 21, 2021 eGFRcr in adults is calculated using the 2021 CKD-EPI creatinine equation which includes age and gender (Naomi et al., NEJM, DOI: 10.1056/UINOom5423379)

## 2022-10-24 NOTE — PROGRESS NOTES
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 Risks and complications of the procedure including surgical and medical risks were discussed including but not limited to the rare complication of mortality. Patient understood the procedure with it's attendant risks and and provided informed consent.         Total time: 30 min  Counseling time: 20 min

## 2022-10-24 NOTE — LETTER
10/24/2022         RE: Jose Pimentel  [address] [facility] [address]        Dear Colleague,    Thank you for referring your patient, Jose Pimentel, to the [facility] TRANSPL[facility]. Please see a copy of my visit note below.    Transplant Surgery Kidney Donor Progress Note     Medical record number: [id number]  YOB: 1960,   Date of Visit:  10/24/2022  For followup after kidney donation.    Assessment and Recommendations: Mr. Pimentel is doing well after kidney donation.     1. Lifting restrictions: 10 lbs until 8 weeks post donation.  2. Followup: 4 weeks as needed  3. Return to work to be determined per patient's progress, expected between 6-8 weeks after surgery.    Total time: 20 minutes  Counselling time: 10 minutes    .    ------------------------------------------------------------------------------------------    S: Mr. Pimentel donate a kidney 1 weeks ago and is doing well, reporting no fevers, chills, dysuria.  He is not and is taking narcotic analgesia.  He is not constipated.  He is mostly back to routine activities of daily living and is not feeling ready to return to work at this time.    Medications:  Prescription Medications as of 10/24/2022       Rx Number Disp Refills Start End Last Dispensed Date Next Fill Date Owning Pharmacy    acetaminophen (TYLENOL) 325 MG tablet  100 tablet 0 10/21/2022    [facility] [place], [place] - [address]    Sig: Take 1-2 tablets (325-650 mg) by mouth every 6 hours as needed for mild pain    Class: E-Prescribe    Route: Oral    calcium citrate (CITRACAL) 950 (200 Ca) MG tablet            Sig: Take 1 tablet by mouth daily    Class: Historical    Route: Oral    Cholecalciferol 10 MCG (400 UNIT) CAPS            Sig: Take 400 Units by mouth daily    Class: Historical    Route: Oral    Flaxseed Oil (LINSEED OIL) OIL            Sig: Take 1 tablet by mouth daily    Class: Historical    Route: Oral     hydrochlorothiazide (HYDRODIURIL) 25 MG tablet    10/21/2022        Sig: Hold until follow up with surgeon    Class: No Print Out    mesalamine (LIALDA) 1.2 g DR tablet    7/20/2012        Sig: Take 300-600 mg by mouth daily    Class: Historical    Route: Oral    Multiple Vitamin (MULTI-VITAMINS) TABS            Sig: Take 1 tablet by mouth daily    Class: Historical    Route: Oral    omega 3 1200 MG CAPS            Sig: Take 1 capsule by mouth daily    Class: Historical    Route: Oral    oxyCODONE (ROXICODONE) 5 MG tablet  14 tablet 0 10/21/2022        Sig: Take 0.5-1 tablets (2.5-5 mg) by mouth every 4 hours as needed for moderate to severe pain (Use acetaminophen first)    Class: Local Print    Earliest Fill Date: 10/21/2022    Route: Oral    polyethylene glycol (MIRALAX) 17 GM/Dose powder  527 g 0 10/21/2022    [place], [place] - [address]     Sig: Take 17 g (1 capful) by mouth daily    Class: E-Prescribe    Route: Oral    senna-docusate (SENOKOT-S/PERICOLACE) 8.6-50 MG tablet  60 tablet 0 10/21/2022    [place], [place] - [facility] [address]    Sig: Take 1-2 tablets by mouth 2 times daily    Class: E-Prescribe    Route: Oral          Exam:   Pulse:  [67] 67  BP: (141)/(75) 141/75  SpO2:  [97 %] 97 %  Appearance: in mild distress.   Skin: normal  Head and Neck: Normal, no rashes or jaundice  Respiratory: normal respiratory excursions, no audible wheeze  Cardiovascular: RRR  Abdomen: rounded, Incision clean and dry  Extremeties: Edema, none  Neuro: without deficit       Labs:   Admission on 10/18/2022, Discharged on 10/21/2022   Component Date Value Ref Range Status     Hold Specimen 10/18/2022 Specimen Received   Final     Sodium 10/18/2022 141  136 - 145 mmol/L Final     Potassium 10/18/2022 4.1  3.4 - 5.3 mmol/L Final     Chloride 10/18/2022 109 (H)  98 - 107 mmol/L Final     Carbon Dioxide (CO2) 10/18/2022 23  22 - 29 mmol/L Final      Anion Gap 10/18/2022 9  7 - 15 mmol/L Final     Urea Nitrogen 10/18/2022 17.4  8.0 - 23.0 mg/dL Final     Creatinine 10/18/2022 0.87  0.67 - 1.17 mg/dL Final     Calcium 10/18/2022 9.1  8.8 - 10.2 mg/dL Final     Glucose 10/18/2022 80  70 - 99 mg/dL Final     GFR Estimate 10/18/2022 >90  >60 mL/min/1.73m2 Final    Effective December 21, 2021 eGFRcr in adults is calculated using the 2021 CKD-EPI creatinine equation which includes age and gender (Naomi et al., NE, DOI: 10.1056/JFGXms8678355)     GLUCOSE BY METER POCT 10/18/2022 86  70 - 99 mg/dL Final     WBC Count 10/18/2022 14.9 (H)  4.0 - 11.0 10e3/uL Final     RBC Count 10/18/2022 4.78  4.40 - 5.90 10e6/uL Final     Hemoglobin 10/18/2022 14.7  13.3 - 17.7 g/dL Final     Hematocrit 10/18/2022 43.1  40.0 - 53.0 % Final     MCV 10/18/2022 90  78 - 100 fL Final     MCH 10/18/2022 30.8  26.5 - 33.0 pg Final     MCHC 10/18/2022 34.1  31.5 - 36.5 g/dL Final     RDW 10/18/2022 12.7  10.0 - 15.0 % Final     Platelet Count 10/18/2022 170  150 - 450 10e3/uL Final     CK 10/18/2022 103  39 - 308 U/L Final     Hemoglobin 10/19/2022 13.4  13.3 - 17.7 g/dL Final     Hematocrit 10/19/2022 39.2 (L)  40.0 - 53.0 % Final     Urea Nitrogen 10/19/2022 20.6  8.0 - 23.0 mg/dL Final     Creatinine 10/19/2022 1.54 (H)  0.67 - 1.17 mg/dL Final     GFR Estimate 10/19/2022 51 (L)  >60 mL/min/1.73m2 Final    Effective December 21, 2021 eGFRcr in adults is calculated using the 2021 CKD-EPI creatinine equation which includes age and gender (Naomi et al., NEJ, DOI: 10.1056/EQIFve3049192)     Color Urine 10/19/2022 Straw  Colorless, Straw, Light Yellow, Yellow Final     Appearance Urine 10/19/2022 Clear  Clear Final     Glucose Urine 10/19/2022 Negative  Negative mg/dL Final     Bilirubin Urine 10/19/2022 Negative  Negative Final     Ketones Urine 10/19/2022 Negative  Negative mg/dL Final     Specific Gravity Urine 10/19/2022 1.009  1.003 - 1.035 Final     Blood Urine 10/19/2022 Negative   Negative Final     pH Urine 10/19/2022 6.0  5.0 - 7.0 Final     Protein Albumin Urine 10/19/2022 Negative  Negative mg/dL Final     Urobilinogen Urine 10/19/2022 Normal  Normal, 2.0 mg/dL Final     Nitrite Urine 10/19/2022 Negative  Negative Final     Leukocyte Esterase Urine 10/19/2022 Negative  Negative Final     Mucus Urine 10/19/2022 Present (A)  None Seen /LPF Final     RBC Urine 10/19/2022 0  <=2 /HPF Final     WBC Urine 10/19/2022 <1  <=5 /HPF Final     CK 10/19/2022 322 (H)  39 - 308 U/L Final     Hold Specimen 10/19/2022 JIC   Final     WBC Count 10/20/2022 11.8 (H)  4.0 - 11.0 10e3/uL Final     RBC Count 10/20/2022 4.48  4.40 - 5.90 10e6/uL Final     Hemoglobin 10/20/2022 13.5  13.3 - 17.7 g/dL Final     Hematocrit 10/20/2022 39.8 (L)  40.0 - 53.0 % Final     MCV 10/20/2022 89  78 - 100 fL Final     MCH 10/20/2022 30.1  26.5 - 33.0 pg Final     MCHC 10/20/2022 33.9  31.5 - 36.5 g/dL Final     RDW 10/20/2022 12.9  10.0 - 15.0 % Final     Platelet Count 10/20/2022 150  150 - 450 10e3/uL Final     Sodium 10/20/2022 136  136 - 145 mmol/L Final     Potassium 10/20/2022 4.2  3.4 - 5.3 mmol/L Final     Chloride 10/20/2022 104  98 - 107 mmol/L Final     Carbon Dioxide (CO2) 10/20/2022 24  22 - 29 mmol/L Final     Anion Gap 10/20/2022 8  7 - 15 mmol/L Final     Urea Nitrogen 10/20/2022 20.2  8.0 - 23.0 mg/dL Final     Creatinine 10/20/2022 1.70 (H)  0.67 - 1.17 mg/dL Final     Calcium 10/20/2022 8.9  8.8 - 10.2 mg/dL Final     Glucose 10/20/2022 108 (H)  70 - 99 mg/dL Final     GFR Estimate 10/20/2022 45 (L)  >60 mL/min/1.73m2 Final    Effective December 21, 2021 eGFRcr in adults is calculated using the 2021 CKD-EPI creatinine equation which includes age and gender (Naomi et al., NEJM, DOI: 10.1056/HEZPdh1704341)       Again, thank you for allowing me to participate in the care of your patient.        Sincerely,        Celeste Florian MD

## 2022-10-25 ENCOUNTER — TELEPHONE (OUTPATIENT)
Dept: TRANSPLANT | Facility: CLINIC | Age: 62
End: 2022-10-25

## 2022-11-03 ENCOUNTER — TELEPHONE (OUTPATIENT)
Dept: TRANSPLANT | Facility: CLINIC | Age: 62
End: 2022-11-03

## 2022-11-03 NOTE — TELEPHONE ENCOUNTER
Called Jose post-hospital discharge from living donor nephrectomy.  Jose is POD 16.    Jose reports:  Incision: C/D/I, no warmth/swelling/tenderness/discharge. Steri-strips intact.  Told Jose he can remove those anytime.  Pain: Still having quite a bit of pain.  Tolerable and working through it, but in pain.  Taking Tylenol.  Discontinued narcotics.  Bowels: Regular/daily bowel movements.   Appetite: Fair.  Less than baseline.  Eating small/frequent meals.  No concerns.  Fluids: Drinking ~2L water/day.  Urinary: Voiding clear/yellow urine w/o difficulty.  Sleep: Fair.  Having trouble staying asleep and comfortable.  Activity: Denies SOB, ambulating frequently, tolerating activity with increasing endurance.  Planning on starting stationary bike soon.  Reviewed safety getting on/off the bike and avoiding use of core muscles.  Has not started driving yet.  Doesn't feel the need to at this time as his wife is available to drive.  Reviewed that he should not drive unless he feels he can use the break safely and quickly.      Planning on inguinal hernia repair at post-op week 5.  Will plan labs around that time.      Reviewed: abdominal precautions, lifting restrictions.  Reminded Jose about UNOS follow-up requirements, including labs in 6 weeks.     Jose knows how to get in touch with me if any questions/concerns arise.    Rabia Thompson RN, BSN, CCTN  Living Donor Coordinator  [phone]

## 2022-11-30 ENCOUNTER — TELEPHONE (OUTPATIENT)
Dept: TRANSPLANT | Facility: CLINIC | Age: 62
End: 2022-11-30

## 2022-11-30 NOTE — TELEPHONE ENCOUNTER
"Jose returned my call.  He had some concerns about his abdominal integrity over the past week.  He felt that his sutures had \"tugged\" after some pressure (coughing/sneezing).  He has had bronchitis and the pressure from coughing has been uncomfortable.  He is worried he may have opened his stitches.  Incision C/D/I.  No s/sx infection.  Although he had intense abdominal pain last week it has subsided and he feels better this week.  He reports he is doing a better job of bracing his abdomen this week.  We reviewed s/sx of hernia.  Denies bulging.  No issues with stooling or inability to keep food/fluids down.  Reviewed s/sx of obstructive hernia.  Encouraged Jose to reach out with any concerns and if he continues to experience abdominal pain or concerns with his incision/abdomen, we can have him evaluated locally, in [place].      Jose also expressed his concerns with his friend (intended recipient) who was in a swap that failed due to donor issues.  I reassured Jose that his voucher recipient remains a priority and although I cannot predict how long it will take for a new offer, I am hopefull that he will have a good match soon.    Rabia Thompson RN, BSN, CCTN  Living Donor Coordinator  [phone]    "

## 2023-03-16 ENCOUNTER — DOCUMENTATION ONLY (OUTPATIENT)
Dept: TRANSPLANT | Facility: CLINIC | Age: 63
End: 2023-03-16

## 2023-03-16 DIAGNOSIS — Z52.4 KIDNEY DONOR: Primary | ICD-10-CM

## 2023-03-16 NOTE — PROGRESS NOTES
Per Jose's request his 6 month PostOp labs were sent to him via US Mail.Also Emailed them to him with instructions.Orders in Epic.

## 2023-06-04 ENCOUNTER — HEALTH MAINTENANCE LETTER (OUTPATIENT)
Age: 63
End: 2023-06-04

## 2023-09-19 ENCOUNTER — MYC MEDICAL ADVICE (OUTPATIENT)
Dept: TRANSPLANT | Facility: CLINIC | Age: 63
End: 2023-09-19

## 2023-09-21 ENCOUNTER — DOCUMENTATION ONLY (OUTPATIENT)
Dept: TRANSPLANT | Facility: CLINIC | Age: 63
End: 2023-09-21

## 2023-09-21 DIAGNOSIS — Z52.4 KIDNEY DONOR: Primary | ICD-10-CM

## 2023-09-21 NOTE — PROGRESS NOTES
PostOp orders sent to Jose per his req to his address given and also req to have copy sent to his Dr Centeno in [place], [place].Orders in [facility].

## 2023-10-01 NOTE — PLAN OF CARE
Vitals: febrile 99.4-100.1 oral. RA. Bp normal. Notify if temp above 100.4 and greater.   Blood glucose: NA  Pain/nausea: oxy 5 mg x 2.   Diet: regular. Good appetite.   Lines: PIV L & L SL.   : good OP.   GI: x 2.   Drains: NA  Skin: incision adhesive strips JANICE. Binder on.   Mobility: up ad alida.   Plan : possible discharge home if afebrile.                                Statement Selected

## 2023-11-07 ENCOUNTER — TELEPHONE (OUTPATIENT)
Dept: TRANSPLANT | Facility: CLINIC | Age: 63
End: 2023-11-07

## 2023-11-07 NOTE — LETTER
PHYSICIAN ORDERS      DATE & TIME ISSUED: 2023 10:43 AM  PATIENT NAME: Jose Pimentel   : 1960     [facility] MR# [if applicable]: [id number]     DIAGNOSIS:  Living Kidney Donor   ICD-10 CODE: z52.4     Basic Metabolic Panel  Cystatin C  Urine Analysis with micro  Urine Protein, Random  Urine Albumin, Random  Urine Albumin-Creatinine Ration    Any questions please call: Rabia Thompson [phone]    Please fax results to: [phone]    .  Garrett Dean in Immunology and Transplantation  Surgical Director, Kidney & Pancreas Transplant Programs  Medical Director, Solid Organ Transplant Unit

## 2023-11-07 NOTE — TELEPHONE ENCOUNTER
"Left VM for Jose.  Asked him to call me back re\" his 1 year post-donor labs and donor questions.     Rabia Thompson RN, BSN, CCTN  Living Donor Coordinator  [phone]    "

## 2023-11-07 NOTE — LETTER
REIMBURSEMENT INFORMATION FOR LIVING ORGAN DONORS    LIVING ORGAN DONOR: This form MUST accompany & remain attached to Orders &  given to Provider and/or Healthcare Facility Business Office    PROVIDER/FACILITY INSTRUCTIONS: By accepting to perform these services for living organ  donation, the provider/facility agrees to exclusively bill the [facility] instead of billing  the patient or any insurance provider and agrees to accept the reimbursement, as described below, as  payment in full for services rendered.    PROVIDER BILLING INSTRUCTIONS:  1. [facility] agrees to pay for all authorized testing ordered by our transplant  program that is related to living organ donation. The attached orders/tests are part of the donor  Evaluation.    2. Do not bill the donor or donor's insurance. Send an itemized invoice, claim or statement to:    [facility]  Transplant Finance/Donor Billing  [address] [place] [address], [place] [address]    3. Billing statements must include the patient first and last name, date of birth, the CPT procedure code  and date of service. Please bill service on the ORIGINAL UBO4 or 1500 with appropriate CPT/HCPCS  codes along with W-9 and send to the above address to insure timely reimbursement.    4. Claims should be submitted no later than six months from the date when services are rendered.  Claims denied for late submission should not be billed to the donor or their private insurance carrier.    5. [facility] will reimburse all charges at 100% of the Medicare Fee Schedule as  defined in the Code of Federal Regulations (CFR) 42, Chapter IV. This is to be considered payment  in full. [facility], the patient, and/or the patient's insurance are NOT to  be billed any balance, co-payment, or deductible, per Medicare regulations. **ATTN: Facility  providing services for attached/enclosed  Living Donor Orders; If facility does NOT AGREE to  the reimbursement rate stated above, PLEASE DENY SERVICES & refer Donor/patient back to  their [facility].    6. Patients are NOT to make any payments at the time of service.    Please forward this information to your billing department so that a donor account can be set up with  these instructions.    Should you have any questions, please contact the Donor Billing office at [phone],  Monday - Friday, 8:00 a.m. to 4:00 p.m.   Thank you for your assistance.

## 2023-11-08 NOTE — TELEPHONE ENCOUNTER
"Jose returned my call.  We reviewed his 1 year post-kidney donation labs.  The labs we received were WNL, except the \"serum creatinine\" which had a \"disclaimer\" that it was a test developed by [employer] and was not FDA approved.  We are unable to interpret the results as we are not familiar with the reference range and limitations of the test.  I asked Jose if he was willing to re-do his labs and I will order a BMP instead of a \"Creatinine\" alone.  Jose is in agreement with that plan. Jose mentioned that his PCP referred him to a Nephrologist for low eGFR.  We reviewed that his kidney function tests have been \"as expected\" for having 1 kidney and his PCP can connect with our Nephrology team if they have any questions.  When I receive Jose's new labs I will review them and reach out to him again.      Rabia Thompson RN, BSN, CCTN  Living Donor Coordinator  [phone]    "

## 2024-07-28 ENCOUNTER — HEALTH MAINTENANCE LETTER (OUTPATIENT)
Age: 64
End: 2024-07-28

## 2024-07-29 DIAGNOSIS — Z52.4 KIDNEY DONOR: Primary | ICD-10-CM

## 2024-09-30 ENCOUNTER — RESULTS ONLY (OUTPATIENT)
Dept: LAB | Facility: CLINIC | Age: 64
End: 2024-09-30

## 2024-10-02 LAB
APPEARANCE UR: CLEAR
BACTERIA URINE (EXTERNAL): ABNORMAL
BILIRUB UR QL: ABNORMAL
BLOOD URINE (EXTERNAL): ABNORMAL
CASTS URINE (EXTERNAL): ABNORMAL /LPF
COLOR UR: YELLOW
CREATININE (EXTERNAL): 1.3 MG/DL (ref 0.76–1.27)
GFR ESTIMATED (EXTERNAL): 62 ML/MIN/1.73M2
GLUCOSE UR STRIP-MCNC: ABNORMAL MG/DL
KETONES UR QL STRIP: ABNORMAL
LEUKOCYTE ESTERASE URINE (EXTERNAL): ABNORMAL
NITRITES URINE (EXTERNAL): ABNORMAL
PH UR: 8 [PH] (ref 5–7.5)
PROTEIN ALBUMIN UR (EXTERNAL): ABNORMAL
RBC URINE (EXTERNAL): ABNORMAL /HPF (ref 0–2)
SPECIFIC GRAVITY URINE (EXTERNAL): 1.01 (ref 1–1.03)
SQUAMOUS EPITHELIAL URINE (EXTERNAL): ABNORMAL /HPF (ref 0–10)
UROBILINOGEN (EXTERNAL): 0.2 MG/DL (ref 0.2–1)
WBC URINE (EXTERNAL): ABNORMAL /HPF (ref 0–5)

## 2025-08-10 ENCOUNTER — HEALTH MAINTENANCE LETTER (OUTPATIENT)
Age: 65
End: 2025-08-10

## (undated) DEVICE — SOL WATER IRRIG 1000ML BOTTLE 2F7114

## (undated) DEVICE — APPLICATOR VISTASEAL LAPAROSCOPIC FLEX TIP 45CM VSTL45

## (undated) DEVICE — ENDO SCOPE WARMER SEAL  C3101

## (undated) DEVICE — RX VISTASEAL FIBRIN SEALANT W/THROMBIN 10ML VST10

## (undated) DEVICE — ENDO GELPORT 100/120MM C8XX2

## (undated) DEVICE — ENDO TROCAR FIRST ENTRY KII FIOS Z-THRD 12X100MM CTF73

## (undated) DEVICE — BASIN SET SINGLE STERILE 13752-624

## (undated) DEVICE — LINEN TOWEL PACK X6 WHITE 5487

## (undated) DEVICE — Device

## (undated) DEVICE — SURGICEL ABSORBABLE HEMOSTAT SNOW 4"X4" 2083

## (undated) DEVICE — SOL NACL 0.9% INJ 1000ML BAG 2B1324X

## (undated) DEVICE — SU MONOCRYL 4-0 PS-2 27" UND Y426H

## (undated) DEVICE — ADPT 5 IN 1 360

## (undated) DEVICE — LINEN TOWEL PACK X5 5464

## (undated) DEVICE — ESU GROUND PAD ADULT W/CORD E7507

## (undated) DEVICE — PREP CHLORAPREP 26ML TINTED HI-LITE ORANGE 930815

## (undated) DEVICE — DRAPE STERI FLUOROSCOPE 35X43"1012 LATEX FREE

## (undated) DEVICE — SUCTION IRR STRYKERFLOW II W/TIP 250-070-520

## (undated) DEVICE — DRSG STERI STRIP 1/2X4" R1547

## (undated) DEVICE — SU VICRYL 3-0 SH 27" UND J416H

## (undated) DEVICE — SU SILK 3-0 TIE 12X30" A304H

## (undated) DEVICE — BLADE CLIPPER SGL USE 9680

## (undated) DEVICE — DRSG PRIMAPORE 02X3" 7133

## (undated) DEVICE — GLOVE PROTEXIS MICRO 7.5  2D73PM75

## (undated) DEVICE — NDL ANGIOCATH 18GA 1 3/4" 4054

## (undated) DEVICE — LINEN GOWN XLG 5407

## (undated) DEVICE — DRAPE ISOLATION BAG 1003

## (undated) DEVICE — CLIP ENDO HEMO-LOC PURPLE LG 544240

## (undated) DEVICE — CATH TRAY FOLEY SURESTEP 16FR W/URNE MTR STLK LATEX A303316A

## (undated) DEVICE — WIPES FOLEY CARE SURESTEP PROVON DFC100

## (undated) DEVICE — DRAPE SHEET REV FOLD 3/4 9349

## (undated) DEVICE — SU SILK 2-0 TIE 12X30" A305H

## (undated) DEVICE — SUCTION MANIFOLD NEPTUNE 2 SYS 4 PORT 0702-020-000

## (undated) DEVICE — STPL POWERED ECHELON VASC 35MM PVE35A

## (undated) DEVICE — DRAPE FLUID WARMING 52 X 60" ORS-321

## (undated) DEVICE — TUBING IRRIG CYSTO/BLADDER SET 81" LF 2C4040

## (undated) DEVICE — ESU ENDO SCISSORS 5MM CVD 5DCS

## (undated) DEVICE — SHEARS HARMONIC ULTRASONIC LAP 36CM CURVE TIP HAR1136

## (undated) DEVICE — BNDG ABDOMINAL BINDER 9X45-62" 79-89071

## (undated) DEVICE — ENDO TROCAR SLEEVE KII Z-THREADED 12X100MM CTS22

## (undated) DEVICE — TUBING SMOKE EVAC PNEUMOCLEAR HEATED 0620050350

## (undated) DEVICE — SU PDS II 0 TP-1 60" Z991G

## (undated) DEVICE — CLIP APPLIER ENDO 5MM M/L LIGAMAX EL5ML

## (undated) DEVICE — DRSG PRIMAPORE 03 1/8X6" 66000318

## (undated) DEVICE — SU VICRYL 0 TIE 54" J608H

## (undated) DEVICE — SU SILK 4-0 TIE 12X30" A303H

## (undated) DEVICE — DEVICE SUTURE PASSER 14GA WECK EFX EFXSP2

## (undated) DEVICE — STRAP UNIVERSAL POSITIONING 2-PIECE 4X47X76" 91-287

## (undated) DEVICE — DRAPE IOBAN INCISE 23X17" 6650EZ

## (undated) RX ORDER — FENTANYL CITRATE 50 UG/ML
INJECTION, SOLUTION INTRAMUSCULAR; INTRAVENOUS
Status: DISPENSED
Start: 2022-10-18

## (undated) RX ORDER — CARDIOPLEG/ORGAN PRESERV NO.1 9-198-2-1
BOTTLE PERFUSION
Status: DISPENSED
Start: 2022-10-18

## (undated) RX ORDER — ACETAMINOPHEN 325 MG/1
TABLET ORAL
Status: DISPENSED
Start: 2022-10-18

## (undated) RX ORDER — ONDANSETRON 2 MG/ML
INJECTION INTRAMUSCULAR; INTRAVENOUS
Status: DISPENSED
Start: 2022-10-18

## (undated) RX ORDER — SODIUM CHLORIDE, SODIUM LACTATE, POTASSIUM CHLORIDE, CALCIUM CHLORIDE 600; 310; 30; 20 MG/100ML; MG/100ML; MG/100ML; MG/100ML
INJECTION, SOLUTION INTRAVENOUS
Status: DISPENSED
Start: 2022-10-18

## (undated) RX ORDER — GLYCOPYRROLATE 0.2 MG/ML
INJECTION, SOLUTION INTRAMUSCULAR; INTRAVENOUS
Status: DISPENSED
Start: 2022-10-18

## (undated) RX ORDER — DEXAMETHASONE SODIUM PHOSPHATE 4 MG/ML
INJECTION, SOLUTION INTRA-ARTICULAR; INTRALESIONAL; INTRAMUSCULAR; INTRAVENOUS; SOFT TISSUE
Status: DISPENSED
Start: 2022-10-18

## (undated) RX ORDER — EPHEDRINE SULFATE 50 MG/ML
INJECTION, SOLUTION INTRAMUSCULAR; INTRAVENOUS; SUBCUTANEOUS
Status: DISPENSED
Start: 2022-10-18

## (undated) RX ORDER — PROPOFOL 10 MG/ML
INJECTION, EMULSION INTRAVENOUS
Status: DISPENSED
Start: 2022-10-18

## (undated) RX ORDER — CEFUROXIME SODIUM 1.5 G/16ML
INJECTION, POWDER, FOR SOLUTION INTRAVENOUS
Status: DISPENSED
Start: 2022-10-18

## (undated) RX ORDER — LIDOCAINE HYDROCHLORIDE 20 MG/ML
INJECTION, SOLUTION EPIDURAL; INFILTRATION; INTRACAUDAL; PERINEURAL
Status: DISPENSED
Start: 2022-10-18

## (undated) RX ORDER — FENTANYL CITRATE-0.9 % NACL/PF 10 MCG/ML
PLASTIC BAG, INJECTION (ML) INTRAVENOUS
Status: DISPENSED
Start: 2022-10-18

## (undated) RX ORDER — GABAPENTIN 300 MG/1
CAPSULE ORAL
Status: DISPENSED
Start: 2022-10-18

## (undated) RX ORDER — FUROSEMIDE 10 MG/ML
INJECTION INTRAMUSCULAR; INTRAVENOUS
Status: DISPENSED
Start: 2022-10-18